# Patient Record
Sex: FEMALE | Race: WHITE | Employment: UNEMPLOYED | URBAN - NONMETROPOLITAN AREA
[De-identification: names, ages, dates, MRNs, and addresses within clinical notes are randomized per-mention and may not be internally consistent; named-entity substitution may affect disease eponyms.]

---
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
## 2020-10-02 ENCOUNTER — APPOINTMENT (OUTPATIENT)
Dept: CT IMAGING | Age: 20
DRG: 234 | End: 2020-10-02
Payer: MEDICAID

## 2020-10-02 ENCOUNTER — HOSPITAL ENCOUNTER (INPATIENT)
Age: 20
LOS: 1 days | Discharge: HOME | DRG: 234 | End: 2020-10-03
Attending: EMERGENCY MEDICINE | Admitting: SURGERY
Payer: MEDICAID

## 2020-10-02 ENCOUNTER — ANESTHESIA EVENT (OUTPATIENT)
Dept: OPERATING ROOM | Age: 20
DRG: 234 | End: 2020-10-02
Payer: MEDICAID

## 2020-10-02 ENCOUNTER — ANESTHESIA (OUTPATIENT)
Dept: OPERATING ROOM | Age: 20
DRG: 234 | End: 2020-10-02
Payer: MEDICAID

## 2020-10-02 VITALS — SYSTOLIC BLOOD PRESSURE: 101 MMHG | OXYGEN SATURATION: 99 % | DIASTOLIC BLOOD PRESSURE: 54 MMHG

## 2020-10-02 PROBLEM — K35.80 ACUTE APPENDICITIS: Status: ACTIVE | Noted: 2020-10-02

## 2020-10-02 LAB
ALBUMIN SERPL-MCNC: 4.6 G/DL (ref 3.5–5.2)
ALP BLD-CCNC: 65 U/L (ref 35–104)
ALT SERPL-CCNC: 7 U/L (ref 0–32)
ANION GAP SERPL CALCULATED.3IONS-SCNC: 13 MMOL/L (ref 7–16)
AST SERPL-CCNC: 14 U/L (ref 0–31)
BACTERIA: ABNORMAL /HPF
BASOPHILS ABSOLUTE: 0.02 E9/L (ref 0–0.2)
BASOPHILS RELATIVE PERCENT: 0.1 % (ref 0–2)
BILIRUB SERPL-MCNC: 0.7 MG/DL (ref 0–1.2)
BILIRUBIN URINE: NEGATIVE
BLOOD, URINE: NEGATIVE
BUN BLDV-MCNC: 9 MG/DL (ref 6–20)
CALCIUM SERPL-MCNC: 9.6 MG/DL (ref 8.6–10.2)
CHLORIDE BLD-SCNC: 99 MMOL/L (ref 98–107)
CLARITY: CLEAR
CO2: 23 MMOL/L (ref 22–29)
COLOR: YELLOW
CREAT SERPL-MCNC: 0.9 MG/DL (ref 0.5–1)
EOSINOPHILS ABSOLUTE: 0 E9/L (ref 0.05–0.5)
EOSINOPHILS RELATIVE PERCENT: 0 % (ref 0–6)
EPITHELIAL CELLS, UA: ABNORMAL /HPF
GFR AFRICAN AMERICAN: >60
GFR NON-AFRICAN AMERICAN: >60 ML/MIN/1.73
GLUCOSE BLD-MCNC: 121 MG/DL (ref 74–99)
GLUCOSE URINE: NEGATIVE MG/DL
HCG, URINE, POC: NEGATIVE
HCT VFR BLD CALC: 40.9 % (ref 34–48)
HEMOGLOBIN: 13.2 G/DL (ref 11.5–15.5)
IMMATURE GRANULOCYTES #: 0.07 E9/L
IMMATURE GRANULOCYTES %: 0.4 % (ref 0–5)
KETONES, URINE: NEGATIVE MG/DL
LACTIC ACID: 1.6 MMOL/L (ref 0.5–2.2)
LEUKOCYTE ESTERASE, URINE: ABNORMAL
LIPASE: 13 U/L (ref 13–60)
LYMPHOCYTES ABSOLUTE: 0.91 E9/L (ref 1.5–4)
LYMPHOCYTES RELATIVE PERCENT: 5.7 % (ref 20–42)
Lab: NORMAL
MCH RBC QN AUTO: 28 PG (ref 26–35)
MCHC RBC AUTO-ENTMCNC: 32.3 % (ref 32–34.5)
MCV RBC AUTO: 86.7 FL (ref 80–99.9)
MONOCYTES ABSOLUTE: 1.5 E9/L (ref 0.1–0.95)
MONOCYTES RELATIVE PERCENT: 9.4 % (ref 2–12)
NEGATIVE QC PASS/FAIL: NORMAL
NEUTROPHILS ABSOLUTE: 13.39 E9/L (ref 1.8–7.3)
NEUTROPHILS RELATIVE PERCENT: 84.4 % (ref 43–80)
NITRITE, URINE: NEGATIVE
PDW BLD-RTO: 14 FL (ref 11.5–15)
PH UA: 6 (ref 5–9)
PLATELET # BLD: 346 E9/L (ref 130–450)
PMV BLD AUTO: 9.9 FL (ref 7–12)
POSITIVE QC PASS/FAIL: NORMAL
POTASSIUM SERPL-SCNC: 3.7 MMOL/L (ref 3.5–5)
PROTEIN UA: NEGATIVE MG/DL
RBC # BLD: 4.72 E12/L (ref 3.5–5.5)
RBC UA: ABNORMAL /HPF (ref 0–2)
SODIUM BLD-SCNC: 135 MMOL/L (ref 132–146)
SPECIFIC GRAVITY UA: >=1.03 (ref 1–1.03)
TOTAL PROTEIN: 7.6 G/DL (ref 6.4–8.3)
UROBILINOGEN, URINE: 1 E.U./DL
WBC # BLD: 15.9 E9/L (ref 4.5–11.5)
WBC UA: ABNORMAL /HPF (ref 0–5)

## 2020-10-02 PROCEDURE — 0DTJ4ZZ RESECTION OF APPENDIX, PERCUTANEOUS ENDOSCOPIC APPROACH: ICD-10-PCS | Performed by: SURGERY

## 2020-10-02 PROCEDURE — 80053 COMPREHEN METABOLIC PANEL: CPT

## 2020-10-02 PROCEDURE — 99284 EMERGENCY DEPT VISIT MOD MDM: CPT

## 2020-10-02 PROCEDURE — 1200000000 HC SEMI PRIVATE

## 2020-10-02 PROCEDURE — 2720000010 HC SURG SUPPLY STERILE: Performed by: SURGERY

## 2020-10-02 PROCEDURE — 99283 EMERGENCY DEPT VISIT LOW MDM: CPT

## 2020-10-02 PROCEDURE — 81001 URINALYSIS AUTO W/SCOPE: CPT

## 2020-10-02 PROCEDURE — 3600000003 HC SURGERY LEVEL 3 BASE: Performed by: SURGERY

## 2020-10-02 PROCEDURE — G0378 HOSPITAL OBSERVATION PER HR: HCPCS

## 2020-10-02 PROCEDURE — 2580000003 HC RX 258: Performed by: NURSE PRACTITIONER

## 2020-10-02 PROCEDURE — 7100000000 HC PACU RECOVERY - FIRST 15 MIN: Performed by: SURGERY

## 2020-10-02 PROCEDURE — 99223 1ST HOSP IP/OBS HIGH 75: CPT | Performed by: SURGERY

## 2020-10-02 PROCEDURE — 99285 EMERGENCY DEPT VISIT HI MDM: CPT

## 2020-10-02 PROCEDURE — 3600000013 HC SURGERY LEVEL 3 ADDTL 15MIN: Performed by: SURGERY

## 2020-10-02 PROCEDURE — 2500000003 HC RX 250 WO HCPCS: Performed by: NURSE PRACTITIONER

## 2020-10-02 PROCEDURE — 88304 TISSUE EXAM BY PATHOLOGIST: CPT

## 2020-10-02 PROCEDURE — 6360000002 HC RX W HCPCS

## 2020-10-02 PROCEDURE — 44970 LAPAROSCOPY APPENDECTOMY: CPT | Performed by: SURGERY

## 2020-10-02 PROCEDURE — 3700000000 HC ANESTHESIA ATTENDED CARE: Performed by: SURGERY

## 2020-10-02 PROCEDURE — 96375 TX/PRO/DX INJ NEW DRUG ADDON: CPT

## 2020-10-02 PROCEDURE — 6360000004 HC RX CONTRAST MEDICATION: Performed by: RADIOLOGY

## 2020-10-02 PROCEDURE — 7100000001 HC PACU RECOVERY - ADDTL 15 MIN: Performed by: SURGERY

## 2020-10-02 PROCEDURE — 96374 THER/PROPH/DIAG INJ IV PUSH: CPT

## 2020-10-02 PROCEDURE — 2500000003 HC RX 250 WO HCPCS

## 2020-10-02 PROCEDURE — 3700000001 HC ADD 15 MINUTES (ANESTHESIA): Performed by: SURGERY

## 2020-10-02 PROCEDURE — 85025 COMPLETE CBC W/AUTO DIFF WBC: CPT

## 2020-10-02 PROCEDURE — 2580000003 HC RX 258

## 2020-10-02 PROCEDURE — 2500000003 HC RX 250 WO HCPCS: Performed by: SURGERY

## 2020-10-02 PROCEDURE — 74177 CT ABD & PELVIS W/CONTRAST: CPT

## 2020-10-02 PROCEDURE — 83690 ASSAY OF LIPASE: CPT

## 2020-10-02 PROCEDURE — 96361 HYDRATE IV INFUSION ADD-ON: CPT

## 2020-10-02 PROCEDURE — 2709999900 HC NON-CHARGEABLE SUPPLY: Performed by: SURGERY

## 2020-10-02 PROCEDURE — 6360000002 HC RX W HCPCS: Performed by: NURSE PRACTITIONER

## 2020-10-02 PROCEDURE — 83605 ASSAY OF LACTIC ACID: CPT

## 2020-10-02 RX ORDER — MIDAZOLAM HYDROCHLORIDE 1 MG/ML
INJECTION INTRAMUSCULAR; INTRAVENOUS PRN
Status: DISCONTINUED | OUTPATIENT
Start: 2020-10-02 | End: 2020-10-02 | Stop reason: SDUPTHER

## 2020-10-02 RX ORDER — BUPIVACAINE HYDROCHLORIDE AND EPINEPHRINE 2.5; 5 MG/ML; UG/ML
INJECTION, SOLUTION EPIDURAL; INFILTRATION; INTRACAUDAL; PERINEURAL PRN
Status: DISCONTINUED | OUTPATIENT
Start: 2020-10-02 | End: 2020-10-02 | Stop reason: ALTCHOICE

## 2020-10-02 RX ORDER — 0.9 % SODIUM CHLORIDE 0.9 %
1000 INTRAVENOUS SOLUTION INTRAVENOUS ONCE
Status: COMPLETED | OUTPATIENT
Start: 2020-10-02 | End: 2020-10-02

## 2020-10-02 RX ORDER — FENTANYL CITRATE 50 UG/ML
INJECTION, SOLUTION INTRAMUSCULAR; INTRAVENOUS PRN
Status: DISCONTINUED | OUTPATIENT
Start: 2020-10-02 | End: 2020-10-02 | Stop reason: SDUPTHER

## 2020-10-02 RX ORDER — ROCURONIUM BROMIDE 10 MG/ML
INJECTION, SOLUTION INTRAVENOUS PRN
Status: DISCONTINUED | OUTPATIENT
Start: 2020-10-02 | End: 2020-10-02 | Stop reason: SDUPTHER

## 2020-10-02 RX ORDER — PROMETHAZINE HYDROCHLORIDE 25 MG/ML
6.25 INJECTION, SOLUTION INTRAMUSCULAR; INTRAVENOUS
Status: DISCONTINUED | OUTPATIENT
Start: 2020-10-02 | End: 2020-10-02 | Stop reason: HOSPADM

## 2020-10-02 RX ORDER — LABETALOL HYDROCHLORIDE 5 MG/ML
5 INJECTION, SOLUTION INTRAVENOUS EVERY 10 MIN PRN
Status: DISCONTINUED | OUTPATIENT
Start: 2020-10-02 | End: 2020-10-02 | Stop reason: HOSPADM

## 2020-10-02 RX ORDER — LIDOCAINE HYDROCHLORIDE 20 MG/ML
INJECTION, SOLUTION INTRAVENOUS PRN
Status: DISCONTINUED | OUTPATIENT
Start: 2020-10-02 | End: 2020-10-02 | Stop reason: SDUPTHER

## 2020-10-02 RX ORDER — HYDROCODONE BITARTRATE AND ACETAMINOPHEN 5; 325 MG/1; MG/1
1 TABLET ORAL EVERY 4 HOURS PRN
Qty: 18 TABLET | Refills: 0 | Status: SHIPPED | OUTPATIENT
Start: 2020-10-02 | End: 2020-10-07

## 2020-10-02 RX ORDER — ONDANSETRON 2 MG/ML
INJECTION INTRAMUSCULAR; INTRAVENOUS PRN
Status: DISCONTINUED | OUTPATIENT
Start: 2020-10-02 | End: 2020-10-02 | Stop reason: SDUPTHER

## 2020-10-02 RX ORDER — MORPHINE SULFATE 2 MG/ML
2 INJECTION, SOLUTION INTRAMUSCULAR; INTRAVENOUS ONCE
Status: COMPLETED | OUTPATIENT
Start: 2020-10-02 | End: 2020-10-02

## 2020-10-02 RX ORDER — SODIUM CHLORIDE 9 MG/ML
INJECTION, SOLUTION INTRAVENOUS CONTINUOUS PRN
Status: DISCONTINUED | OUTPATIENT
Start: 2020-10-02 | End: 2020-10-02 | Stop reason: SDUPTHER

## 2020-10-02 RX ORDER — ONDANSETRON 2 MG/ML
4 INJECTION INTRAMUSCULAR; INTRAVENOUS ONCE
Status: COMPLETED | OUTPATIENT
Start: 2020-10-02 | End: 2020-10-02

## 2020-10-02 RX ORDER — HYDRALAZINE HYDROCHLORIDE 20 MG/ML
5 INJECTION INTRAMUSCULAR; INTRAVENOUS EVERY 10 MIN PRN
Status: DISCONTINUED | OUTPATIENT
Start: 2020-10-02 | End: 2020-10-02 | Stop reason: HOSPADM

## 2020-10-02 RX ORDER — MEPERIDINE HYDROCHLORIDE 25 MG/ML
12.5 INJECTION INTRAMUSCULAR; INTRAVENOUS; SUBCUTANEOUS EVERY 5 MIN PRN
Status: DISCONTINUED | OUTPATIENT
Start: 2020-10-02 | End: 2020-10-02 | Stop reason: HOSPADM

## 2020-10-02 RX ORDER — SUCCINYLCHOLINE/SOD CL,ISO/PF 200MG/10ML
SYRINGE (ML) INTRAVENOUS PRN
Status: DISCONTINUED | OUTPATIENT
Start: 2020-10-02 | End: 2020-10-02 | Stop reason: SDUPTHER

## 2020-10-02 RX ORDER — MORPHINE SULFATE 4 MG/ML
4 INJECTION, SOLUTION INTRAMUSCULAR; INTRAVENOUS ONCE
Status: DISCONTINUED | OUTPATIENT
Start: 2020-10-02 | End: 2020-10-02

## 2020-10-02 RX ORDER — PROPOFOL 10 MG/ML
INJECTION, EMULSION INTRAVENOUS PRN
Status: DISCONTINUED | OUTPATIENT
Start: 2020-10-02 | End: 2020-10-02 | Stop reason: SDUPTHER

## 2020-10-02 RX ORDER — DEXAMETHASONE SODIUM PHOSPHATE 10 MG/ML
INJECTION, SOLUTION INTRAMUSCULAR; INTRAVENOUS PRN
Status: DISCONTINUED | OUTPATIENT
Start: 2020-10-02 | End: 2020-10-02 | Stop reason: SDUPTHER

## 2020-10-02 RX ADMIN — SODIUM CHLORIDE 1000 ML: 9 INJECTION, SOLUTION INTRAVENOUS at 19:39

## 2020-10-02 RX ADMIN — ONDANSETRON HYDROCHLORIDE 4 MG: 2 INJECTION, SOLUTION INTRAMUSCULAR; INTRAVENOUS at 22:25

## 2020-10-02 RX ADMIN — ROCURONIUM BROMIDE 20 MG: 10 INJECTION, SOLUTION INTRAVENOUS at 22:22

## 2020-10-02 RX ADMIN — DEXAMETHASONE SODIUM PHOSPHATE 10 MG: 10 INJECTION, SOLUTION INTRAMUSCULAR; INTRAVENOUS at 22:12

## 2020-10-02 RX ADMIN — METRONIDAZOLE 500 MG: 500 INJECTION, SOLUTION INTRAVENOUS at 21:46

## 2020-10-02 RX ADMIN — FENTANYL CITRATE 50 MCG: 50 INJECTION, SOLUTION INTRAMUSCULAR; INTRAVENOUS at 22:21

## 2020-10-02 RX ADMIN — IOPAMIDOL 90 ML: 755 INJECTION, SOLUTION INTRAVENOUS at 20:10

## 2020-10-02 RX ADMIN — CEFTRIAXONE SODIUM 2 G: 2 INJECTION, POWDER, FOR SOLUTION INTRAMUSCULAR; INTRAVENOUS at 21:45

## 2020-10-02 RX ADMIN — MIDAZOLAM 2 MG: 1 INJECTION INTRAMUSCULAR; INTRAVENOUS at 22:05

## 2020-10-02 RX ADMIN — Medication 140 MG: at 22:07

## 2020-10-02 RX ADMIN — PROPOFOL 150 MG: 10 INJECTION, EMULSION INTRAVENOUS at 22:07

## 2020-10-02 RX ADMIN — LIDOCAINE HYDROCHLORIDE 100 MG: 20 INJECTION, SOLUTION INTRAVENOUS at 22:07

## 2020-10-02 RX ADMIN — FENTANYL CITRATE 100 MCG: 50 INJECTION, SOLUTION INTRAMUSCULAR; INTRAVENOUS at 22:07

## 2020-10-02 RX ADMIN — MORPHINE SULFATE 2 MG: 2 INJECTION, SOLUTION INTRAMUSCULAR; INTRAVENOUS at 19:38

## 2020-10-02 RX ADMIN — SODIUM CHLORIDE: 9 INJECTION, SOLUTION INTRAVENOUS at 21:58

## 2020-10-02 RX ADMIN — ONDANSETRON 4 MG: 2 INJECTION INTRAMUSCULAR; INTRAVENOUS at 19:39

## 2020-10-02 RX ADMIN — SUGAMMADEX 250 MG: 100 INJECTION, SOLUTION INTRAVENOUS at 22:43

## 2020-10-02 ASSESSMENT — PULMONARY FUNCTION TESTS
PIF_VALUE: 16
PIF_VALUE: 19
PIF_VALUE: 1
PIF_VALUE: 17
PIF_VALUE: 13
PIF_VALUE: 18
PIF_VALUE: 23
PIF_VALUE: 22
PIF_VALUE: 1
PIF_VALUE: 29
PIF_VALUE: 18
PIF_VALUE: 21
PIF_VALUE: 22
PIF_VALUE: 23
PIF_VALUE: 22
PIF_VALUE: 14
PIF_VALUE: 0
PIF_VALUE: 22
PIF_VALUE: 23
PIF_VALUE: 13
PIF_VALUE: 16
PIF_VALUE: 22
PIF_VALUE: 1
PIF_VALUE: 26
PIF_VALUE: 17
PIF_VALUE: 23
PIF_VALUE: 23
PIF_VALUE: 1
PIF_VALUE: 22
PIF_VALUE: 10
PIF_VALUE: 19
PIF_VALUE: 19
PIF_VALUE: 1
PIF_VALUE: 1
PIF_VALUE: 13
PIF_VALUE: 23
PIF_VALUE: 19
PIF_VALUE: 17
PIF_VALUE: 22
PIF_VALUE: 24
PIF_VALUE: 1
PIF_VALUE: 2
PIF_VALUE: 17
PIF_VALUE: 19
PIF_VALUE: 19
PIF_VALUE: 20
PIF_VALUE: 1
PIF_VALUE: 1
PIF_VALUE: 16
PIF_VALUE: 1
PIF_VALUE: 16
PIF_VALUE: 13

## 2020-10-02 ASSESSMENT — PAIN SCALES - GENERAL
PAINLEVEL_OUTOF10: 0
PAINLEVEL_OUTOF10: 0
PAINLEVEL_OUTOF10: 7
PAINLEVEL_OUTOF10: 0
PAINLEVEL_OUTOF10: 7

## 2020-10-02 ASSESSMENT — PAIN DESCRIPTION - LOCATION: LOCATION: ABDOMEN

## 2020-10-02 NOTE — ED NOTES
Bed: 07  Expected date:   Expected time:   Means of arrival:   Comments:  triage     Debbie Posadas RN  10/02/20 8719

## 2020-10-03 VITALS
SYSTOLIC BLOOD PRESSURE: 103 MMHG | RESPIRATION RATE: 16 BRPM | TEMPERATURE: 97.5 F | HEIGHT: 65 IN | WEIGHT: 140 LBS | BODY MASS INDEX: 23.32 KG/M2 | HEART RATE: 68 BPM | DIASTOLIC BLOOD PRESSURE: 55 MMHG | OXYGEN SATURATION: 98 %

## 2020-10-03 LAB
BASOPHILS ABSOLUTE: 0.02 E9/L (ref 0–0.2)
BASOPHILS RELATIVE PERCENT: 0.2 % (ref 0–2)
EOSINOPHILS ABSOLUTE: 0 E9/L (ref 0.05–0.5)
EOSINOPHILS RELATIVE PERCENT: 0 % (ref 0–6)
HCT VFR BLD CALC: 36 % (ref 34–48)
HEMOGLOBIN: 11.5 G/DL (ref 11.5–15.5)
IMMATURE GRANULOCYTES #: 0.04 E9/L
IMMATURE GRANULOCYTES %: 0.3 % (ref 0–5)
LYMPHOCYTES ABSOLUTE: 0.63 E9/L (ref 1.5–4)
LYMPHOCYTES RELATIVE PERCENT: 5.1 % (ref 20–42)
MCH RBC QN AUTO: 27.9 PG (ref 26–35)
MCHC RBC AUTO-ENTMCNC: 31.9 % (ref 32–34.5)
MCV RBC AUTO: 87.4 FL (ref 80–99.9)
MONOCYTES ABSOLUTE: 0.3 E9/L (ref 0.1–0.95)
MONOCYTES RELATIVE PERCENT: 2.4 % (ref 2–12)
NEUTROPHILS ABSOLUTE: 11.47 E9/L (ref 1.8–7.3)
NEUTROPHILS RELATIVE PERCENT: 92 % (ref 43–80)
PDW BLD-RTO: 14.1 FL (ref 11.5–15)
PLATELET # BLD: 267 E9/L (ref 130–450)
PMV BLD AUTO: 11.8 FL (ref 7–12)
RBC # BLD: 4.12 E12/L (ref 3.5–5.5)
WBC # BLD: 12.5 E9/L (ref 4.5–11.5)

## 2020-10-03 PROCEDURE — 96372 THER/PROPH/DIAG INJ SC/IM: CPT

## 2020-10-03 PROCEDURE — 2580000003 HC RX 258: Performed by: STUDENT IN AN ORGANIZED HEALTH CARE EDUCATION/TRAINING PROGRAM

## 2020-10-03 PROCEDURE — 36415 COLL VENOUS BLD VENIPUNCTURE: CPT

## 2020-10-03 PROCEDURE — 6370000000 HC RX 637 (ALT 250 FOR IP): Performed by: STUDENT IN AN ORGANIZED HEALTH CARE EDUCATION/TRAINING PROGRAM

## 2020-10-03 PROCEDURE — 6360000002 HC RX W HCPCS: Performed by: STUDENT IN AN ORGANIZED HEALTH CARE EDUCATION/TRAINING PROGRAM

## 2020-10-03 PROCEDURE — G0378 HOSPITAL OBSERVATION PER HR: HCPCS

## 2020-10-03 PROCEDURE — 85025 COMPLETE CBC W/AUTO DIFF WBC: CPT

## 2020-10-03 RX ORDER — ACETAMINOPHEN 325 MG/1
650 TABLET ORAL EVERY 6 HOURS
Status: DISCONTINUED | OUTPATIENT
Start: 2020-10-03 | End: 2020-10-03 | Stop reason: HOSPADM

## 2020-10-03 RX ORDER — OXYCODONE HYDROCHLORIDE 5 MG/1
5 TABLET ORAL EVERY 4 HOURS PRN
Status: DISCONTINUED | OUTPATIENT
Start: 2020-10-03 | End: 2020-10-03 | Stop reason: HOSPADM

## 2020-10-03 RX ORDER — OXYCODONE HYDROCHLORIDE 10 MG/1
10 TABLET ORAL EVERY 4 HOURS PRN
Status: DISCONTINUED | OUTPATIENT
Start: 2020-10-03 | End: 2020-10-03 | Stop reason: HOSPADM

## 2020-10-03 RX ORDER — SODIUM CHLORIDE 9 MG/ML
INJECTION, SOLUTION INTRAVENOUS CONTINUOUS
Status: DISCONTINUED | OUTPATIENT
Start: 2020-10-03 | End: 2020-10-03 | Stop reason: HOSPADM

## 2020-10-03 RX ORDER — SODIUM CHLORIDE 0.9 % (FLUSH) 0.9 %
10 SYRINGE (ML) INJECTION PRN
Status: DISCONTINUED | OUTPATIENT
Start: 2020-10-03 | End: 2020-10-03 | Stop reason: HOSPADM

## 2020-10-03 RX ORDER — SODIUM CHLORIDE 0.9 % (FLUSH) 0.9 %
10 SYRINGE (ML) INJECTION EVERY 12 HOURS SCHEDULED
Status: DISCONTINUED | OUTPATIENT
Start: 2020-10-03 | End: 2020-10-03 | Stop reason: HOSPADM

## 2020-10-03 RX ORDER — ONDANSETRON 2 MG/ML
4 INJECTION INTRAMUSCULAR; INTRAVENOUS EVERY 6 HOURS PRN
Status: DISCONTINUED | OUTPATIENT
Start: 2020-10-03 | End: 2020-10-03 | Stop reason: HOSPADM

## 2020-10-03 RX ORDER — ONDANSETRON 4 MG/1
4 TABLET, ORALLY DISINTEGRATING ORAL EVERY 8 HOURS PRN
Status: DISCONTINUED | OUTPATIENT
Start: 2020-10-03 | End: 2020-10-03 | Stop reason: HOSPADM

## 2020-10-03 RX ADMIN — ENOXAPARIN SODIUM 40 MG: 40 INJECTION SUBCUTANEOUS at 08:35

## 2020-10-03 RX ADMIN — ACETAMINOPHEN 650 MG: 325 TABLET, FILM COATED ORAL at 01:04

## 2020-10-03 RX ADMIN — ACETAMINOPHEN 650 MG: 325 TABLET, FILM COATED ORAL at 08:38

## 2020-10-03 RX ADMIN — SODIUM CHLORIDE: 9 INJECTION, SOLUTION INTRAVENOUS at 01:03

## 2020-10-03 ASSESSMENT — PAIN DESCRIPTION - ORIENTATION: ORIENTATION: RIGHT;LEFT;MID

## 2020-10-03 ASSESSMENT — PAIN DESCRIPTION - LOCATION: LOCATION: ABDOMEN

## 2020-10-03 ASSESSMENT — PAIN SCALES - GENERAL
PAINLEVEL_OUTOF10: 6
PAINLEVEL_OUTOF10: 4
PAINLEVEL_OUTOF10: 4

## 2020-10-03 ASSESSMENT — PAIN DESCRIPTION - PROGRESSION: CLINICAL_PROGRESSION: NOT CHANGED

## 2020-10-03 ASSESSMENT — PAIN DESCRIPTION - DESCRIPTORS: DESCRIPTORS: ACHING;CONSTANT;DISCOMFORT

## 2020-10-03 ASSESSMENT — PAIN DESCRIPTION - ONSET: ONSET: GRADUAL

## 2020-10-03 ASSESSMENT — PAIN DESCRIPTION - FREQUENCY: FREQUENCY: INTERMITTENT

## 2020-10-03 ASSESSMENT — PAIN - FUNCTIONAL ASSESSMENT: PAIN_FUNCTIONAL_ASSESSMENT: PREVENTS OR INTERFERES SOME ACTIVE ACTIVITIES AND ADLS

## 2020-10-03 ASSESSMENT — PAIN DESCRIPTION - PAIN TYPE: TYPE: SURGICAL PAIN

## 2020-10-03 NOTE — H&P
GENERAL SURGERY  HISTORY AND PHYSICAL  10/2/2020    Chief Complaint   Patient presents with    Abdominal Pain     started 0500 this AM, N/V/D, mid lower pain       [facility]  Pravin Carr is a 21 y.o. female who presents for evaluation of lower abdominal pain since approximately 5 AM.  Denies previous medical problems or previous surgery. States that pain occurred this morning and has been constant and severe throughout the day without improvement. Nothing seems to make the pain better or worse. She had a single episode of watery diarrhea today. Denies fevers or chills. Reports nonbilious emesis a few times today. Has not eaten all day. No sick contacts. Other than episode of diarrhea today denies changes in bowel habits. No family history of Crohn's disease or ulcerative colitis  Denies tobacco, alcohol, or illicit drug use. WBCs 15.9, tachycardic to 107. CT A/P reviewed consistent with acute appendicitis      History reviewed. No pertinent past medical history. History reviewed. No pertinent surgical history. Prior to Admission medications    Medication Sig Start Date End Date Taking? Authorizing Provider   ibuprofen (ADVIL;MOTRIN) 800 MG tablet Take 1 tablet by mouth every 8 hours as needed for Pain 6/14/18   Mini Negrete MD       No Known Allergies    History reviewed. No pertinent family history. Social History     Tobacco Use    Smoking status: Never Smoker    Smokeless tobacco: Never Used   Substance Use Topics    Alcohol use: No    Drug use: No         Review of Systems: pertinent ROS listed in HPI, all others negative       PHYSICAL EXAM:    Vitals:    10/02/20 1720   BP: 124/86   Pulse:    Resp: 18   Temp:    SpO2:        GENERAL:  NAD. A&Ox3. HEAD:  Normocephalic, atraumatic. EYES:   No scleral icterus. PERRLA. LUNGS:  No increased work of breathing. CARDIOVASCULAR: RR  ABDOMEN:  Soft, non-distended, RLQ TTP with guarding and mild rebound. No rigidity.  No scars or

## 2020-10-03 NOTE — ED PROVIDER NOTES
ED Attending  CC: No       Department of Emergency Medicine   ED  Provider Note  Admit Date/RoomTime: 10/2/2020  6:33 PM  ED Room: OR POOL /NONE  Chief Complaint:       Abdominal Pain (started 0500 this AM, N/V/D, mid lower pain)    History of Present Illness   Source of history provided by:  patient. History/Exam Limitations: none. Pravin Carr is a 21 y.o. old female who has a past medical history of: History reviewed. No pertinent past medical history. presents to the emergency department by private vehicle, for complaints of sudden onset aching, cramping pain in the lower abdomen without radiation which began several hour(s) prior to arrival.   There has been no similar episodes in the past .  Since onset the symptoms have been persistent. The pain is associated with nausea,vomiting, and diarrhea. The pain is aggravated by movement and relieved by nothing. There has been NO back pain, chills, cloudy urine or constipation. .  ROS   Pertinent positives and negatives are stated within HPI, all other systems reviewed and are negative. History reviewed. No pertinent surgical history. Social History:  reports that she has never smoked. She has never used smokeless tobacco. She reports that she does not drink alcohol or use drugs. Family History: family history is not on file. Allergies: Patient has no known allergies. Physical Exam            ED Triage Vitals   BP Temp Temp Source Pulse Resp SpO2 Height Weight   10/02/20 1720 10/02/20 1701 10/02/20 1701 10/02/20 1701 10/02/20 1720 10/02/20 1701 10/02/20 1720 10/02/20 1720   124/86 96.2 °F (35.7 °C) Temporal 107 18 97 % 5' 5\" (1.651 m) 140 lb (63.5 kg)      Oxygen Saturation Interpretation: Normal.    · General Appearance/Constitutional:  Alert, development consistent with age  · HEENT:  NC/NT. PERRLA. Airway patent. · Neck:  Supple. No lymphadenopathy. · Respiratory:  No retractions.   Lungs Clear to auscultation and breath sounds equal.  · CV:  Regular rate and rhythm. · GI:  General Appearance: normal.         Bowel sounds: normal bowel sounds. Distension:  None. Tenderness: moderate tenderness is present in the lower abdomen, rebound tenderness is present in the lower abdomen, no guarding. Liver: non-palpable and non-tender. Spleen:  non-palpable and non-tender. Pulsatile Mass: absent. Hernia:  no inguinal or femoral hernias noted. · Back: CVA Tenderness: No.  · Integument:  Normal turgor. Warm, dry, without visible rash, unless noted elsewhere. · Lymphatics: No edema, cap.refill <3sec. · Neurological:  Orientation age-appropriate. Motor functions intact.     Lab / Imaging Results   (All laboratory and radiology results have been personally reviewed by myself)  Labs:  Results for orders placed or performed during the hospital encounter of 10/02/20   Comprehensive Metabolic Panel   Result Value Ref Range    Sodium 135 132 - 146 mmol/L    Potassium 3.7 3.5 - 5.0 mmol/L    Chloride 99 98 - 107 mmol/L    CO2 23 22 - 29 mmol/L    Anion Gap 13 7 - 16 mmol/L    Glucose 121 (H) 74 - 99 mg/dL    BUN 9 6 - 20 mg/dL    CREATININE 0.9 0.5 - 1.0 mg/dL    GFR Non-African American >60 >=60 mL/min/1.73    GFR African American >60     Calcium 9.6 8.6 - 10.2 mg/dL    Total Protein 7.6 6.4 - 8.3 g/dL    Alb 4.6 3.5 - 5.2 g/dL    Total Bilirubin 0.7 0.0 - 1.2 mg/dL    Alkaline Phosphatase 65 35 - 104 U/L    ALT 7 0 - 32 U/L    AST 14 0 - 31 U/L   CBC Auto Differential   Result Value Ref Range    WBC 15.9 (H) 4.5 - 11.5 E9/L    RBC 4.72 3.50 - 5.50 E12/L    Hemoglobin 13.2 11.5 - 15.5 g/dL    Hematocrit 40.9 34.0 - 48.0 %    MCV 86.7 80.0 - 99.9 fL    MCH 28.0 26.0 - 35.0 pg    MCHC 32.3 32.0 - 34.5 %    RDW 14.0 11.5 - 15.0 fL    Platelets 247 376 - 546 E9/L    MPV 9.9 7.0 - 12.0 fL    Neutrophils % 84.4 (H) 43.0 - 80.0 %    Immature Granulocytes % 0.4 0.0 - 5.0 %    Lymphocytes % 5.7 (L) 20.0 - 42.0 %    Monocytes % 9.4 2.0 - 12.0 %    Eosinophils % 0.0 0.0 - 6.0 %    Basophils % 0.1 0.0 - 2.0 %    Neutrophils Absolute 13.39 (H) 1.80 - 7.30 E9/L    Immature Granulocytes # 0.07 E9/L    Lymphocytes Absolute 0.91 (L) 1.50 - 4.00 E9/L    Monocytes Absolute 1.50 (H) 0.10 - 0.95 E9/L    Eosinophils Absolute 0.00 (L) 0.05 - 0.50 E9/L    Basophils Absolute 0.02 0.00 - 0.20 E9/L   Lipase   Result Value Ref Range    Lipase 13 13 - 60 U/L   Urinalysis with Microscopic   Result Value Ref Range    Color, UA Yellow Straw/Yellow    Clarity, UA Clear Clear    Glucose, Ur Negative Negative mg/dL    Bilirubin Urine Negative Negative    Ketones, Urine Negative Negative mg/dL    Specific Gravity, UA >=1.030 1.005 - 1.030    Blood, Urine Negative Negative    pH, UA 6.0 5.0 - 9.0    Protein, UA Negative Negative mg/dL    Urobilinogen, Urine 1.0 <2.0 E.U./dL    Nitrite, Urine Negative Negative    Leukocyte Esterase, Urine TRACE (A) Negative    WBC, UA 0-1 0 - 5 /HPF    RBC, UA NONE 0 - 2 /HPF    Epithelial Cells, UA MODERATE /HPF    Bacteria, UA FEW (A) None Seen /HPF   Lactic Acid, Plasma   Result Value Ref Range    Lactic Acid 1.6 0.5 - 2.2 mmol/L   POC Pregnancy Urine Qual   Result Value Ref Range    HCG, Urine, POC Negative Negative    Lot Number [id number]     Positive QC Pass/Fail Pass     Negative QC Pass/Fail Pass      Imaging: All Radiology results interpreted by Radiologist unless otherwise noted. CT ABDOMEN PELVIS W IV CONTRAST Additional Contrast? None   Final Result   Acute appendicitis with thickened inflamed appendix without complications. There is small-bowel ileus. Somewhat thickened urinary bladder. Cystitis is considered. Complex bilateral ovarian cysts with a small amount of free fluid. If   clinically indicated, consider ultrasonographic surveillance. RECOMMENDATIONS:   The ED physician was notified.              ED Course / Medical Decision Making     Medications   metronidazole (FLAGYL) 500 mg in NaCl 100 mL IVPB premix (500 mg Intravenous New Bag 10/2/20 2146)   0.9 % sodium chloride bolus (0 mLs Intravenous Stopped 10/2/20 2153)   ondansetron (ZOFRAN) injection 4 mg (4 mg Intravenous Given 10/2/20 1939)   morphine (PF) injection 2 mg (2 mg Intravenous Given 10/2/20 1938)   iopamidol (ISOVUE-370) 76 % injection 90 mL (90 mLs Intravenous Given 10/2/20 2010)   cefTRIAXone (ROCEPHIN) 2 g in sterile water 20 mL IV syringe (2 g Intravenous Given 10/2/20 2145)        Re-Evaluations:  10/2/20      Time: 2100    Patients symptoms are improving. Consultations:             IP CONSULT TO GENERAL SURGERY  Spoke with Dr. Marie De Leon (general surgery). Discussed case. They will admit this patient. Procedures:   none    MDM: Patient will be admitted to the hospital for acute appendicitis. Patient at this time is nontoxic in appearance and in distress. Patient at this time is agreeable to plan of care questions were answered. Patient was seen and evaluated by Dr. Oswaldo Vazquez as well as general surgery. Patient was treated with IV fluids pain medication antinausea medication and IV antibiotics. Counseling:   I have spoken with the patient and discussed todays results, in addition to providing specific details for the plan of care and counseling regarding the diagnosis and prognosis and are agreeable with the plan. Assessment    Acute appendicitis     This patient's ED course included: a personal history and physicial examination, re-evaluation prior to disposition and multiple bedside re-evaluations  This patient has improved during their ED course. Plan   Admit to med/surg floor. Patient condition is stable. New Medications     Current Discharge Medication List        Electronically signed by JARED Pastrana CNP   DD: 10/2/20  **This report was transcribed using voice recognition software.  Every effort was made to ensure accuracy; however, inadvertent computerized transcription errors may be present.   END OF PROVIDER NOTE        JARED Pagan - CNP  10/02/20 15-A [address] JARED Jensen - CNP  10/02/20 5188

## 2020-10-03 NOTE — PROGRESS NOTES
Discharge instructions given and explained to pt who voiced understanding, denies needs or questions, awaits mom for ride home.

## 2020-10-03 NOTE — ANESTHESIA PRE PROCEDURE
Department of Anesthesiology  Preprocedure Note       Name:  Desiree Bonds   Age:  21 y.o.  :  2000                                          MRN:  [id number]         Date:  10/2/2020      Surgeon: Saray Alexandra):  Elvin Beatty MD    Procedure: Procedure(s):  APPENDECTOMY LAPAROSCOPIC    Medications prior to admission:   Prior to Admission medications    Medication Sig Start Date End Date Taking? Authorizing Provider   ibuprofen (ADVIL;MOTRIN) 800 MG tablet Take 1 tablet by mouth every 8 hours as needed for Pain 18   Carrie Braden MD       Current medications:    Current Facility-Administered Medications   Medication Dose Route Frequency Provider Last Rate Last Dose    metronidazole (FLAGYL) 500 mg in NaCl 100 mL IVPB premix  500 mg Intravenous Once JARED Burdick -  mL/hr at 10/02/20 2146 500 mg at 10/02/20 2146     Current Outpatient Medications   Medication Sig Dispense Refill    ibuprofen (ADVIL;MOTRIN) 800 MG tablet Take 1 tablet by mouth every 8 hours as needed for Pain 60 tablet 0       Allergies:  No Known Allergies    Problem List:    Patient Active Problem List   Diagnosis Code    Acute appendicitis K35.80       Past Medical History:  History reviewed. No pertinent past medical history. Past Surgical History:  History reviewed. No pertinent surgical history. Social History:    Social History     Tobacco Use    Smoking status: Never Smoker    Smokeless tobacco: Never Used   Substance Use Topics    Alcohol use:  No                                Counseling given: Not Answered      Vital Signs (Current):   Vitals:    10/02/20 1701 10/02/20 1720   BP:  124/86   Pulse: 107    Resp:  18   Temp: 35.7 °C (96.2 °F)    TempSrc: Temporal    SpO2: 97%    Weight:  140 lb (63.5 kg)   Height:  5' 5\" (1.651 m)                                              BP Readings from Last 3 Encounters:   10/02/20 124/86   18 124/78       NPO Status: Time of last liquid consumption: 0500                        Time of last solid consumption: 0500                        Date of last liquid consumption: 10/02/20                        Date of last solid food consumption: 10/02/20    BMI:   Wt Readings from Last 3 Encounters:   10/02/20 140 lb (63.5 kg)   06/13/18 130 lb (59 kg) (61 %, Z= 0.27)*     * Growth percentiles are based on [facility] (Girls, 2-20 Years) data. Body mass index is 23.3 kg/m². CBC:   Lab Results   Component Value Date    WBC 15.9 10/02/2020    RBC 4.72 10/02/2020    HGB 13.2 10/02/2020    HCT 40.9 10/02/2020    MCV 86.7 10/02/2020    RDW 14.0 10/02/2020     10/02/2020       CMP:   Lab Results   Component Value Date     10/02/2020    K 3.7 10/02/2020    CL 99 10/02/2020    CO2 23 10/02/2020    BUN 9 10/02/2020    CREATININE 0.9 10/02/2020    GFRAA >60 10/02/2020    LABGLOM >60 10/02/2020    GLUCOSE 121 10/02/2020    PROT 7.6 10/02/2020    CALCIUM 9.6 10/02/2020    BILITOT 0.7 10/02/2020    ALKPHOS 65 10/02/2020    AST 14 10/02/2020    ALT 7 10/02/2020       POC Tests: No results for input(s): POCGLU, POCNA, POCK, POCCL, POCBUN, POCHEMO, POCHCT in the last 72 hours.     Coags: No results found for: PROTIME, INR, APTT    HCG (If Applicable):   Lab Results   Component Value Date    PREGTESTUR neg 05/05/2015        ABGs: No results found for: PHART, PO2ART, JBL4LWS, YLW8GRC, BEART, G7WFSFKQ     Type & Screen (If Applicable):  No results found for: LABABO, LABRH    Drug/Infectious Status (If Applicable):  No results found for: HIV, HEPCAB    COVID-19 Screening (If Applicable): No results found for: COVID19      Anesthesia Evaluation  Patient summary reviewed and Nursing notes reviewed no history of anesthetic complications:   Airway: Mallampati: II  TM distance: >3 FB   Neck ROM: full  Mouth opening: > = 3 FB Dental: normal exam         Pulmonary:Negative Pulmonary ROS breath sounds clear to auscultation                             Cardiovascular:Negative CV ROS            Rhythm: regular  Rate: normal           Beta Blocker:  Not on Beta Blocker         Neuro/Psych:   Negative Neuro/Psych ROS              GI/Hepatic/Renal:            ROS comment: appendicitis. Endo/Other: Negative Endo/Other ROS                    Abdominal:           Vascular: negative vascular ROS. CT Abdomen Pelvis W IV Contrast 10/2/20 Impression   Acute appendicitis with thickened inflamed appendix without complications. There is small-bowel ileus. Somewhat thickened urinary bladder. Cystitis is considered. Complex bilateral ovarian cysts with a small amount of free fluid. If clinically indicated, consider ultrasonographic surveillance. RECOMMENDATIONS: The ED physician was notified. Anesthesia Plan      general     ASA 2 - emergent       Induction: intravenous and rapid sequence. BIS  MIPS: Postoperative opioids intended and Prophylactic antiemetics administered. Anesthetic plan and risks discussed with patient. Use of blood products discussed with patient whom consented to blood products. Plan discussed with CRNA and attending.                   Lillian Peterson RN   10/2/2020

## 2020-10-03 NOTE — OP NOTE
Operative Note      Patient: Gume Villavicencio  YOB: 2000  MRN: [id number]    Date of Procedure: 10/2/2020    Pre-Op Diagnosis: APPENDICITIS    Post-Op Diagnosis: Same       Procedure(s):  APPENDECTOMY LAPAROSCOPIC    Surgeon(s):  Obie Gonzalez MD    Assistant:   Resident: Leigh Sifuentes DO    Anesthesia: General    Estimated Blood Loss (mL): Minimal    Complications: None    Specimens:   ID Type Source Tests Collected by Time Destination   A : appendix Tissue Appendix SURGICAL PATHOLOGY Obie Gonzalez MD 10/2/2020 5589        Implants:  * No implants in log *      Drains:   [REMOVED] Urethral Catheter Non-latex;Straight-tip 16 fr (Removed)       Findings: acute appendicitis     Detailed Description of Procedure: The patient was brought to the operating room and positioned supine on the OR table. Sequential compression devices were placed on the patient's lower extremities and functioning. Preoperative antibiotics were administered. Anesthesia was obtained without complication as per the anesthesia record. Immediately prior to the procedure a time-out was called and the surgical checklist was reviewed and agreed upon by all present. The patient was prepped and draped in the usual sterile fashion and the procedure went forth with strict aseptic technique under maximal barrier precautions. Using a 11 blade, a 5 mm supraumbilical incision was made. The Veress needle was inserted through the incision into the peritoneal cavity and placement of the Veress needle was confirmed with a saline drop test. A 5 mm Optiview trocar was inserted into the peritoneal cavity under direct visualization. Next a 10 mm port was placed in the left lower quadrant and a 5 mm port was placed in the midline suprapubic area. The abdomen was inspected and the appendix was identified. It was inflammed consistent with appendicitis. The appendix was grasped and elevated with an atraumatic grasper.  Using a [place],

## 2020-10-08 NOTE — ANESTHESIA POSTPROCEDURE EVALUATION
Department of Anesthesiology  Postprocedure Note    Patient: Tyrel Gamboa  MRN: [id number]  YOB: 2000  Date of evaluation: 10/8/2020  Time:  7:13 AM     Procedure Summary     Date:  10/02/20 Room / Location:  [facility] OR 10 / [facility]    Anesthesia Start:  2158 Anesthesia Stop:  2255    Procedure:  APPENDECTOMY LAPAROSCOPIC (N/A ) Diagnosis:  (APPENDICITIS)    Surgeon:  Regino Medina MD Responsible Provider:  Kaitlin Cam MD    Anesthesia Type:  general ASA Status:  2 - Emergent          Anesthesia Type: general    Dinesh Phase I: Dinesh Score: 10    Dinesh Phase II:      Last vitals: Reviewed and per EMR flowsheets.        Anesthesia Post Evaluation    Patient location during evaluation: PACU  Patient participation: complete - patient participated  Level of consciousness: awake and alert  Airway patency: patent  Nausea & Vomiting: no nausea and no vomiting  Complications: no  Cardiovascular status: blood pressure returned to baseline and hemodynamically stable  Respiratory status: acceptable and spontaneous ventilation  Hydration status: euvolemic

## 2020-10-19 ENCOUNTER — TELEPHONE (OUTPATIENT)
Dept: SURGERY | Age: 20
End: 2020-10-19

## 2020-11-02 ENCOUNTER — OFFICE VISIT (OUTPATIENT)
Dept: SURGERY | Age: 20
End: 2020-11-02
Payer: MEDICAID

## 2020-11-02 VITALS
SYSTOLIC BLOOD PRESSURE: 114 MMHG | TEMPERATURE: 96.8 F | OXYGEN SATURATION: 99 % | WEIGHT: 144 LBS | DIASTOLIC BLOOD PRESSURE: 75 MMHG | RESPIRATION RATE: 14 BRPM | BODY MASS INDEX: 23.99 KG/M2 | HEART RATE: 74 BPM | HEIGHT: 65 IN

## 2020-11-02 PROCEDURE — 99024 POSTOP FOLLOW-UP VISIT: CPT | Performed by: SURGERY

## 2020-11-02 PROCEDURE — 99212 OFFICE O/P EST SF 10 MIN: CPT | Performed by: SURGERY

## 2020-11-02 NOTE — PROGRESS NOTES
[facility]  PROGRESS NOTE  ATTENDING NOTE    Chief Complaint   Patient presents with    Post-Op Check     P/O lap appendectomy DOS 10/2/2020. Pt states doing good no questions or concerns     S:  21y/o F s/p lap appy on 10/2/2020. She is doing well. Tolerating diet. Having normal bowel function. I have reviewed her CT with her and her pathology.     /75 (Site: Right Upper Arm, Position: Sitting, Cuff Size: Medium Adult)   Pulse 74   Temp 96.8 °F (36 °C) (Temporal)   Resp 14   Ht 5' 5\" (1.651 m)   Wt 144 lb (65.3 kg)   SpO2 99%   BMI 23.96 kg/m²   Gen:  NAD  Abd:  Soft, NT, ND  Wound:  C/d/i, no evidence of hernia    ASSESSMENT/PLAN:  Acute appendicitis--s/p lap appy  --no acute issues  --RTC PRN    Lady Naomy MD, MSc, FACS  11/2/2020  11:58 AM

## 2022-02-15 ENCOUNTER — OFFICE VISIT (OUTPATIENT)
Dept: FAMILY MEDICINE CLINIC | Age: 22
End: 2022-02-15
Payer: MEDICAID

## 2022-02-15 VITALS
SYSTOLIC BLOOD PRESSURE: 115 MMHG | RESPIRATION RATE: 16 BRPM | OXYGEN SATURATION: 99 % | BODY MASS INDEX: 21.17 KG/M2 | TEMPERATURE: 97.2 F | HEIGHT: 64 IN | HEART RATE: 77 BPM | WEIGHT: 124 LBS | DIASTOLIC BLOOD PRESSURE: 83 MMHG

## 2022-02-15 DIAGNOSIS — F41.9 ANXIETY: Primary | ICD-10-CM

## 2022-02-15 PROCEDURE — G8420 CALC BMI NORM PARAMETERS: HCPCS

## 2022-02-15 PROCEDURE — 99203 OFFICE O/P NEW LOW 30 MIN: CPT

## 2022-02-15 PROCEDURE — G8484 FLU IMMUNIZE NO ADMIN: HCPCS

## 2022-02-15 PROCEDURE — 1036F TOBACCO NON-USER: CPT

## 2022-02-15 PROCEDURE — G8428 CUR MEDS NOT DOCUMENT: HCPCS

## 2022-02-15 SDOH — ECONOMIC STABILITY: FOOD INSECURITY: WITHIN THE PAST 12 MONTHS, THE FOOD YOU BOUGHT JUST DIDN'T LAST AND YOU DIDN'T HAVE MONEY TO GET MORE.: NEVER TRUE

## 2022-02-15 SDOH — ECONOMIC STABILITY: FOOD INSECURITY: WITHIN THE PAST 12 MONTHS, YOU WORRIED THAT YOUR FOOD WOULD RUN OUT BEFORE YOU GOT MONEY TO BUY MORE.: NEVER TRUE

## 2022-02-15 ASSESSMENT — PATIENT HEALTH QUESTIONNAIRE - PHQ9
SUM OF ALL RESPONSES TO PHQ QUESTIONS 1-9: 0
2. FEELING DOWN, DEPRESSED OR HOPELESS: 0
SUM OF ALL RESPONSES TO PHQ QUESTIONS 1-9: 0
SUM OF ALL RESPONSES TO PHQ QUESTIONS 1-9: 0
1. LITTLE INTEREST OR PLEASURE IN DOING THINGS: 0
SUM OF ALL RESPONSES TO PHQ QUESTIONS 1-9: 0
SUM OF ALL RESPONSES TO PHQ9 QUESTIONS 1 & 2: 0

## 2022-02-15 ASSESSMENT — LIFESTYLE VARIABLES
HOW OFTEN DO YOU HAVE A DRINK CONTAINING ALCOHOL: MONTHLY OR LESS
HOW MANY STANDARD DRINKS CONTAINING ALCOHOL DO YOU HAVE ON A TYPICAL DAY: 1 OR 2

## 2022-02-15 ASSESSMENT — SOCIAL DETERMINANTS OF HEALTH (SDOH): HOW HARD IS IT FOR YOU TO PAY FOR THE VERY BASICS LIKE FOOD, HOUSING, MEDICAL CARE, AND HEATING?: NOT HARD AT ALL

## 2022-02-15 NOTE — PROGRESS NOTES
[facility] [facility] RESIDENCY PROGRAM  DATE OF VISIT : 2022    Patient : Steph Magdaleno   Age : 24 y.o.  : 2000   MRN : [id number]   ______________________________________________________________________    Chief Complaint:   Chief Complaint   Patient presents with    [facility]       HPI:   History obtained from the patient. Steph Magdaleno is a 24 y.o. female who presents today to clinic to [facility]. No past medical history per patient and she does not take any pills or medicines. PSH of appendectomy on 10/2/2020. Patient comes today requesting medical approval for an emotional support animal. Joan Deshpande states she has had undiagnosed anxiety/depression for the past 5-10 years and has never looked for any sort of mental health provider until now. Joan Deshpande states that her cat makes her feels less anxious, more relaxed and happy and has helped her deal with this conditions in the past and has been of of great health overall for her mental health and wellbeing. Joan Deshpande first noticed her anxiety  5-10 years ago when she started going to events with large groups of people and also when she had to speak in public events to large crowds. This symptoms improve with drinking alcohol which she does only when she is out with friends or on big events. ( Alcohol :  Wine, Social drinker,2-3 cups, 0-1 time per week). Joan Deshpande has never looked for help with any sort of mental health provider, but today she is willing to be referred to a psychologist for cognitive Behavioral therapy. Would not like to start any medication before trying therapy first. Today, she denies any suicidal ideation or thoughts about hurting herself or others. No hallucinations, hearing voices or having any sort of intrusive or paranoid thoughts/ideas. Denies Drugs  Denies smoking  [facility] mom and dad with anxiety and depression , mom also with bipolar disorder.   Last blood work/labs from 4 months ago (October 2020) include Lipase, CBC, CMP, UA, all nonremarkable. Past Medical History:  Past Medical History:   Diagnosis Date    Anxiety        Past Surgical History:  Past Surgical History:   Procedure Laterality Date    LAPAROSCOPIC APPENDECTOMY N/A 10/2/2020    APPENDECTOMY LAPAROSCOPIC performed by Alberto Chavis MD at [facility] OR       Family History:  Family History   Problem Relation Age of Onset    Bipolar Disorder Mother     Depression Mother     Anxiety Disorder Mother     Depression Father        Social History:  Social History     Socioeconomic History    Marital status: Single     Spouse name: None    Number of children: None    Years of education: None    Highest education level: None   Occupational History    None   Tobacco Use    Smoking status: Never Smoker    Smokeless tobacco: Never Used    Tobacco comment: occ vaping   Substance and Sexual Activity    Alcohol use: No    Drug use: No    Sexual activity: None   Other Topics Concern    None   Social History Narrative    None     Social Determinants of Health     Financial Resource Strain: Low Risk     Difficulty of Paying Living Expenses: Not hard at all   Food Insecurity: No Food Insecurity    Worried About Running Out of Food in the Last Year: Never true    [place] of Food in the Last Year: Never true   Transportation Needs:     Lack of Transportation (Medical): Not on file    Lack of Transportation (Non-Medical):  Not on file   Physical Activity:     Days of Exercise per Week: Not on file    Minutes of Exercise per Session: Not on file   Stress:     Feeling of Stress : Not on file   Social Connections:     Frequency of Communication with Friends and Family: Not on file    Frequency of Social Gatherings with Friends and Family: Not on file    Attends Hoahaoism Services: Not on file    Active Member of Clubs or Organizations: Not on file    Attends Club or Organization Meetings: Not on file    Marital Status: Not on file   Intimate Partner Violence:     Fear of Current or Ex-Partner: Not on file    Emotionally Abused: Not on file    Physically Abused: Not on file    Sexually Abused: Not on file   Housing Stability:     Unable to Pay for Housing in the Last Year: Not on file    Number of [place] in the Last Year: Not on file    Unstable Housing in the Last Year: Not on file       Allergies:   No Known Allergies    Medications:    No current outpatient medications on file. No current facility-administered medications for this visit. Review of Systems:  Review of Systems   Constitutional: Negative for activity change, chills, diaphoresis, fatigue and fever. HENT: Negative for ear discharge, sinus pressure, sinus pain, sore throat, trouble swallowing and voice change. Eyes: Negative for photophobia, redness and visual disturbance. Respiratory: Negative for cough, shortness of breath, wheezing and stridor. Cardiovascular: Negative for chest pain, palpitations and leg swelling. Gastrointestinal: Negative for abdominal distention, abdominal pain, diarrhea, nausea and vomiting. Endocrine: Negative for polydipsia, polyphagia and polyuria. Genitourinary: Negative for enuresis, flank pain, frequency, vaginal bleeding, vaginal discharge and vaginal pain. Musculoskeletal: Negative for arthralgias, back pain, gait problem, neck pain and neck stiffness. Skin: Negative for pallor, rash and wound. Neurological: Negative for tremors, seizures, syncope, speech difficulty, weakness and numbness. Psychiatric/Behavioral: Positive for decreased concentration. Negative for hallucinations, self-injury, sleep disturbance and suicidal ideas. The patient is nervous/anxious.  The patient is not hyperactive.      ______________________________________________________________________    Physical Exam:    Vitals: /83 (Site: Right Upper Arm, Position: Sitting, Cuff Size: Medium Adult)   Pulse 77   Temp 97.2 °F (36.2 °C) (Temporal)   Resp 16   Ht 5' 4\" (1.626 m)   Wt 124 lb (56.2 kg)   LMP 02/08/2022   SpO2 99%   BMI 21.28 kg/m²   Physical Exam  Constitutional:       General: She is not in acute distress. Appearance: She is normal weight. She is not ill-appearing or toxic-appearing. HENT:      Head: Normocephalic and atraumatic. Right Ear: Tympanic membrane normal.      Left Ear: Tympanic membrane normal.      Nose: Nose normal.      Mouth/Throat:      Mouth: Mucous membranes are moist.      Pharynx: No oropharyngeal exudate. Eyes:      Pupils: Pupils are equal, round, and reactive to light. Cardiovascular:      Rate and Rhythm: Normal rate and regular rhythm. Pulses: Normal pulses. Heart sounds: Normal heart sounds. No murmur heard. No gallop. Pulmonary:      Effort: Pulmonary effort is normal. No respiratory distress. Breath sounds: No wheezing, rhonchi or rales. Abdominal:      General: Abdomen is flat. Bowel sounds are normal. There is no distension. Palpations: There is no mass. Tenderness: There is no abdominal tenderness. Musculoskeletal:         General: No tenderness or signs of injury. Cervical back: Normal range of motion. Right lower leg: No edema. Left lower leg: No edema. Skin:     General: Skin is warm. Capillary Refill: Capillary refill takes less than 2 seconds. Coloration: Skin is not jaundiced. Neurological:      General: No focal deficit present. Mental Status: She is alert and oriented to person, place, and time. Cranial Nerves: No cranial nerve deficit. Motor: No weakness. Gait: Gait normal.   Psychiatric:         Behavior: Behavior normal.         Thought Content: Thought content normal.         Judgment: Judgment normal.      Comments: Sad mood,somewhat reserved upon questioning initially but more comfortable at a later time. ______________________________________________________________________    Assessment & Plan:  1. Anxiety  Stephanie Enrique would not like to start taking any medication without trying Cognitive behavioral therapy first. She was instructed on the risks/benefits of starting SSRI and the benefits that counseling and Cognitive behavioral therapy can provided and add for a complete medical/CBT approach. She was agreeable to start seeing Dr. Kayce Cochran in this same office in 1-2 weeks if able to set up appointment. Will call me or this office if she is not able to schedule appointment with her in 1-2 weeks. IKE form was filled out during this visit. - [facility]        RTC in 1 month to address  health maintenance and anxiety follow up. Return to Office: Return in about 1 month (around 3/15/2022), or if symptoms worsen or fail to improve, for Screening labs follow up anxiety. Joel De La Rosa MD   This case was discussed with Dr. Urmila Agee

## 2022-02-15 NOTE — PROGRESS NOTES
Attending Physician Statement    S:   Chief Complaint   Patient presents with   Raad Katz Establish Care      No past medical history . Had appendectomy 2 months ago. watns to have approval for emotional support animal.   Has anxiety and depression for past 5 years. Has never seen anyone for this or taken anythign for this. Feels sad and depressed. Has anxiety 2//2 school and life in general. Not finding relief with any hobbies or usual activities. Does nto want medications. Ok to talk to counselor. She denies any drug/alcohol/tobacco.     Her cat heps her feel less anxious and happier when she is at home . O: Blood pressure 115/83, pulse 77, temperature 97.2 °F (36.2 °C), temperature source Temporal, resp. rate 16, height 5' 4\" (1.626 m), weight 124 lb (56.2 kg), last menstrual period 02/08/2022, SpO2 99 %. Exam:   Heart - RRR   Lungs - clear     A: anxiety/depression  P:  IKE paperwork    Establish with Dr. Ami Gray as ordered    Attending Attestation   I have discussed the case, including pertinent history and exam findings with the resident. I also have personally seen and examined the patient. I agree with the documented assessment and plan.

## 2022-02-16 ASSESSMENT — ENCOUNTER SYMPTOMS
DIARRHEA: 0
ABDOMINAL PAIN: 0
NAUSEA: 0
SINUS PAIN: 0
PHOTOPHOBIA: 0
VOICE CHANGE: 0
BACK PAIN: 0
SINUS PRESSURE: 0
COUGH: 0
TROUBLE SWALLOWING: 0
SORE THROAT: 0
WHEEZING: 0
SHORTNESS OF BREATH: 0
ABDOMINAL DISTENTION: 0
VOMITING: 0
EYE REDNESS: 0
STRIDOR: 0

## 2022-03-17 ENCOUNTER — OFFICE VISIT (OUTPATIENT)
Dept: FAMILY MEDICINE CLINIC | Age: 22
End: 2022-03-17
Payer: MEDICAID

## 2022-03-17 VITALS
TEMPERATURE: 98.1 F | HEIGHT: 64 IN | BODY MASS INDEX: 22.02 KG/M2 | DIASTOLIC BLOOD PRESSURE: 54 MMHG | RESPIRATION RATE: 14 BRPM | SYSTOLIC BLOOD PRESSURE: 108 MMHG | WEIGHT: 129 LBS | HEART RATE: 74 BPM | OXYGEN SATURATION: 99 %

## 2022-03-17 DIAGNOSIS — Z00.00 HEALTHCARE MAINTENANCE: Primary | ICD-10-CM

## 2022-03-17 DIAGNOSIS — F41.9 ANXIETY: ICD-10-CM

## 2022-03-17 DIAGNOSIS — Z00.00 HEALTHCARE MAINTENANCE: ICD-10-CM

## 2022-03-17 PROCEDURE — G8420 CALC BMI NORM PARAMETERS: HCPCS

## 2022-03-17 PROCEDURE — 36415 COLL VENOUS BLD VENIPUNCTURE: CPT | Performed by: FAMILY MEDICINE

## 2022-03-17 PROCEDURE — 99213 OFFICE O/P EST LOW 20 MIN: CPT

## 2022-03-17 PROCEDURE — 1036F TOBACCO NON-USER: CPT

## 2022-03-17 PROCEDURE — 99212 OFFICE O/P EST SF 10 MIN: CPT

## 2022-03-17 PROCEDURE — G8427 DOCREV CUR MEDS BY ELIG CLIN: HCPCS

## 2022-03-17 PROCEDURE — G8484 FLU IMMUNIZE NO ADMIN: HCPCS

## 2022-03-17 ASSESSMENT — LIFESTYLE VARIABLES: HOW OFTEN DO YOU HAVE A DRINK CONTAINING ALCOHOL: NEVER

## 2022-03-17 NOTE — PROGRESS NOTES
Attending Physician Statement    S:   Chief Complaint   Patient presents with    Check-Up      F/U anxiety. Got a therapy cat, which seems to help. No further panic attacks. Will be seeing psychologist for CBT. O: Blood pressure (!) 108/54, pulse 74, temperature 98.1 °F (36.7 °C), temperature source Temporal, resp. rate 14, height 5' 4\" (1.626 m), weight 129 lb (58.5 kg), last menstrual period 03/04/2022, SpO2 99 %. Exam:   Heart - RRR   Lungs - clear   Psych - normal mood and affect, not anxious appearing  A: As above  P:  Schedule pap smear   Hep C, HIV, TSH   Follow-up as ordered    I have discussed the case, including pertinent history and exam findings with the resident. I agree with the documented assessment and plan.

## 2022-03-17 NOTE — PROGRESS NOTES
[facility] RESIDENCY PROGRAM  DATE OF VISIT : 3/18/2022    Patient : Ofelia Castellanos   Age : 24 y.o.  : 2000   MRN : [id number]   ______________________________________________________________________    Chief Complaint:   Chief Complaint   Patient presents with    Check-Up       HPI:   History obtained from the patient. Ofelia Castellanos is a 24 y.o. female who presents today to clinic for follow up on anxiety and healthcare maintenance. Last visit, patient was instructed to follow up with psychologist Dr. Mariana Trinh and paperwork was approved for having her therapy pet living with her in her current apartment at [facility]. Today, Corina Gupta states that she feels better since she has her cat with her as she feels that it makes her feel more relaxed and stress free after returning from her academics. She will follow with psychologist next week as instructed and appointment is set up. Corina Gupta will like to follow up with previous plan regarding not starting any medication before seeing psychologist.  Denies having any panic attacks, suicidal thoughts or ideations, hallucinations or worsening of her anxiety since last visit. Patient has not had a pap smear and wants to follow up with this in this office. Currently sexually active, one male partner uses condoms. Has not had HPV vaccine.         Past Medical History:  Past Medical History:   Diagnosis Date    Anxiety        Past Surgical History:  Past Surgical History:   Procedure Laterality Date    LAPAROSCOPIC APPENDECTOMY N/A 10/2/2020    APPENDECTOMY LAPAROSCOPIC performed by Greg Braun MD at [facility] OR       Family History:  Family History   Problem Relation Age of Onset    Bipolar Disorder Mother     Depression Mother     Anxiety Disorder Mother     Depression Father        Social History:  Social History     Socioeconomic History    Marital status: Single     Spouse name: None    Number of children: None    Years of education: None    Highest education level: None   Occupational History    None   Tobacco Use    Smoking status: Never Smoker    Smokeless tobacco: Never Used    Tobacco comment: occ vaping   Substance and Sexual Activity    Alcohol use: No    Drug use: No    Sexual activity: None   Other Topics Concern    None   Social History Narrative    None     Social Determinants of Health     Financial Resource Strain: Low Risk     Difficulty of Paying Living Expenses: Not hard at all   Food Insecurity: No Food Insecurity    Worried About Running Out of Food in the Last Year: Never true    Agapito of Food in the Last Year: Never true   Transportation Needs:     Lack of Transportation (Medical): Not on file    Lack of Transportation (Non-Medical): Not on file   Physical Activity:     Days of Exercise per Week: Not on file    Minutes of Exercise per Session: Not on file   Stress:     Feeling of Stress : Not on file   Social Connections:     Frequency of Communication with Friends and Family: Not on file    Frequency of Social Gatherings with Friends and Family: Not on file    Attends Muslim Services: Not on file    Active Member of [address] [place] [address] or Organizations: Not on file    Attends Club or Organization Meetings: Not on file    Marital Status: Not on file   Intimate Partner Violence:     Fear of Current or Ex-Partner: Not on file    Emotionally Abused: Not on file    Physically Abused: Not on file    Sexually Abused: Not on file   Housing Stability:     Unable to Pay for Housing in the Last Year: Not on file    Number of [place] in the Last Year: Not on file    Unstable Housing in the Last Year: Not on file       Allergies:   No Known Allergies    Medications:    No current outpatient medications on file. No current facility-administered medications for this visit. Review of Systems:  Review of Systems   Constitutional: Positive for fatigue. Negative for chills and fever. HENT: Negative for sinus pain, sneezing and voice change. Eyes: Negative for photophobia, redness and visual disturbance. Respiratory: Negative for apnea and wheezing. Cardiovascular: Negative for chest pain, palpitations and leg swelling. Gastrointestinal: Negative for constipation, diarrhea, nausea and vomiting. Endocrine: Negative for polyphagia and polyuria. Genitourinary: Negative for dysuria, vaginal discharge and vaginal pain. Musculoskeletal: Negative for arthralgias, back pain, gait problem and myalgias. Skin: Negative for pallor, rash and wound. Neurological: Negative for tremors, seizures, syncope and weakness. Psychiatric/Behavioral: Negative for self-injury, sleep disturbance and suicidal ideas. The patient is nervous/anxious. The patient is not hyperactive.      ______________________________________________________________________    Physical Exam:    Vitals: BP (!) 108/54 (Site: Right Upper Arm, Position: Sitting, Cuff Size: Medium Adult)   Pulse 74   Temp 98.1 °F (36.7 °C) (Temporal)   Resp 14   Ht 5' 4\" (1.626 m)   Wt 129 lb (58.5 kg)   LMP 03/04/2022   SpO2 99%   BMI 22.14 kg/m²   Physical Exam  Constitutional:       General: She is not in acute distress. Appearance: She is normal weight. She is not ill-appearing or toxic-appearing. HENT:      Head: Normocephalic. Eyes:      Extraocular Movements: Extraocular movements intact. Conjunctiva/sclera: Conjunctivae normal.      Pupils: Pupils are equal, round, and reactive to light. Cardiovascular:      Rate and Rhythm: Normal rate and regular rhythm. Pulses: Normal pulses. Heart sounds: No murmur heard. No gallop. Pulmonary:      Effort: Pulmonary effort is normal. No respiratory distress. Breath sounds: No stridor. No wheezing or rhonchi. Abdominal:      General: Abdomen is flat. Bowel sounds are normal. There is no distension. Palpations: Abdomen is soft.    Musculoskeletal: General: No swelling. Normal range of motion. Cervical back: Normal range of motion. Right lower leg: No edema. Left lower leg: No edema. Skin:     General: Skin is warm. Capillary Refill: Capillary refill takes less than 2 seconds. Findings: No bruising, lesion or rash. Neurological:      Mental Status: She is alert and oriented to person, place, and time. Mental status is at baseline. Motor: No weakness. Gait: Gait normal.   Psychiatric:         Mood and Affect: Mood normal.         Behavior: Behavior normal.         Thought Content: Thought content normal.      Comments: Not anxious or nervous appearing this visit.       ______________________________________________________________________    Assessment & Plan:  1. Healthcare maintenance    Will come back for pap smear in 2 weeks. Chlamydia screen to be done then. HPV vaccine to be discussed then. - HIV Screen; Future  - Hepatitis C Antibody; Future    2. Anxiety  Will see psychology on March 24. Anxiety has not worsened since previous visit and states her therapy cat has provided some relief of symptoms. At this point, she does not want to start any medications but is agreeable to start medicine in the future if needed. Will coordinate with Parker Enamorado regarding her mental health care and evaluate if she would benefit from SSRIs in the future or if Cognitive behavioral therapy as monotherapy would be more ideal for her.   - TSH with Reflex to FT4; Future        Return to Office: Return in about 2 weeks (around 3/31/2022) for Pap smear.     Karina Hernandez MD   This case was discussed with Dr. Jacques Clemens

## 2022-03-18 LAB
HEPATITIS C ANTIBODY INTERPRETATION: NORMAL
HIV-1 AND HIV-2 ANTIBODIES: NORMAL
T4 FREE: 1.49 NG/DL (ref 0.93–1.7)
TSH SERPL DL<=0.05 MIU/L-ACNC: 0.56 UIU/ML (ref 0.27–4.2)

## 2022-03-18 ASSESSMENT — ENCOUNTER SYMPTOMS
SINUS PAIN: 0
BACK PAIN: 0
DIARRHEA: 0
EYE REDNESS: 0
NAUSEA: 0
WHEEZING: 0
APNEA: 0
VOICE CHANGE: 0
CONSTIPATION: 0
VOMITING: 0
PHOTOPHOBIA: 0

## 2022-04-06 ENCOUNTER — OFFICE VISIT (OUTPATIENT)
Dept: FAMILY MEDICINE CLINIC | Age: 22
End: 2022-04-06
Payer: MEDICAID

## 2022-04-06 VITALS
DIASTOLIC BLOOD PRESSURE: 72 MMHG | HEIGHT: 63 IN | SYSTOLIC BLOOD PRESSURE: 104 MMHG | WEIGHT: 127.2 LBS | RESPIRATION RATE: 18 BRPM | BODY MASS INDEX: 22.54 KG/M2 | OXYGEN SATURATION: 100 % | TEMPERATURE: 97.2 F | HEART RATE: 97 BPM

## 2022-04-06 DIAGNOSIS — Z12.4 PAP SMEAR FOR CERVICAL CANCER SCREENING: ICD-10-CM

## 2022-04-06 DIAGNOSIS — Z01.419 WELL WOMAN EXAM: Primary | ICD-10-CM

## 2022-04-06 PROCEDURE — 99212 OFFICE O/P EST SF 10 MIN: CPT

## 2022-04-06 PROCEDURE — 99395 PREV VISIT EST AGE 18-39: CPT

## 2022-04-06 NOTE — PROGRESS NOTES
S: 24 y.o. female here for her first well woman examination. Patient is sexually active, has regular periods, is not taking any oral contraception, denies any vaginal discharge or breast concerns. Her periods last approximately 4 days. They are not excessively heavy. O: VS: /72 (Site: Left Upper Arm, Position: Sitting, Cuff Size: Medium Adult)   Pulse 97   Temp 97.2 °F (36.2 °C) (Temporal)   Resp 18   Ht 5' 3\" (1.6 m)   Wt 127 lb 3.2 oz (57.7 kg)   LMP 03/26/2022 (Approximate)   SpO2 100%   BMI 22.53 kg/m²    General: NAD, appropriate affect and grooming   Breast exam:  Breasts are symmetric bilaterally; there are no masses, pain, lumps, skin changes or nipple discharge    examination:  Normal external genitalia; vaginal opening with mild amount of thin white discharge; vaginal walls were pink and without lesion; the cervix was erythematous but without lesions; Os was viewed and brush was utilized for the Thin Prep pap testing; normal bimanual examination   Ext:  No edema  Impression/Plan:  1. Well woman exam  -thin prep pap  -GC/Chla testing  -will call patient with results. Attending Physician Statement  I have discussed the case, including pertinent history and exam findings with the resident. I also have seen the patient and performed key portions of the examination. I agree with the documented assessment and plan.

## 2022-04-06 NOTE — PROGRESS NOTES
SUBJECTIVE:   24 y.o. female for annual routine Pap and checkup. No current outpatient medications on file. No current facility-administered medications for this visit. Allergies: Patient has no known allergies. Patient's last menstrual period was 03/26/2022 (approximate). 24year old here for well women examination. Menses last regularly 4 days every 30 days with normal flow. Sexually active . Not on any contraceptives at the moment  No family history of Breast,Cervical cancer      ROS:  Feeling well. No dyspnea or chest pain on exertion. No abdominal pain, change in bowel habits, black or bloody stools. No urinary tract symptoms. GYN ROS: normal menses, no abnormal bleeding, pelvic pain or discharge, no breast pain or new or enlarging lumps on self exam. No neurological complaints. OBJECTIVE:   The patient appears well, alert, oriented x 3, in no distress. /72 (Site: Left Upper Arm, Position: Sitting, Cuff Size: Medium Adult)   Pulse 97   Temp 97.2 °F (36.2 °C) (Temporal)   Resp 18   Ht 5' 3\" (1.6 m)   Wt 127 lb 3.2 oz (57.7 kg)   LMP 03/26/2022 (Approximate)   SpO2 100%   BMI 22.53 kg/m²   ENT normal.  Neck supple. No adenopathy or thyromegaly. NORM. Lungs are clear, good air entry, no wheezes, rhonchi or rales. S1 and S2 normal, no murmurs, regular rate and rhythm. Abdomen soft without tenderness, guarding, mass or organomegaly. Extremities show no edema, normal peripheral pulses. Neurological is normal, no focal findings. BREAST EXAM: breasts appear normal, no suspicious masses, no skin or nipple changes or axillary nodes    PELVIC EXAM: normal external genitalia, vulva, vagina, cervix, uterus and adnexa, VAGINA: scant vaginal discharge - white and creamy, CERVIX: mildly erythematous but no visible lesions.     ASSESSMENT:   well woman    PLAN:   pap smear  additional lab tests per orders  return annually or prn    This note is electronically signed by Tena Tubbs Ruy River MD on 4/6/2022 at 6:54 PM  Reviewed by attending physician : Betty Daley

## 2022-04-11 ENCOUNTER — TELEPHONE (OUTPATIENT)
Dept: FAMILY MEDICINE CLINIC | Age: 22
End: 2022-04-11

## 2022-04-11 LAB
CHLAMYDIA BY THIN PREP: NEGATIVE
N. GONORRHOEAE DNA, THIN PREP: NEGATIVE
SOURCE: NORMAL

## 2022-04-11 NOTE — TELEPHONE ENCOUNTER
Attempted to call  patient x 3 to inform her about Pap smear results. Unable to reach her at current number.       This note is electronically signed by Lee Saravia MD on 4/11/2022 at 3:22 PM

## 2022-07-27 ENCOUNTER — TELEPHONE (OUTPATIENT)
Dept: FAMILY MEDICINE CLINIC | Age: 22
End: 2022-07-27

## 2022-07-27 NOTE — TELEPHONE ENCOUNTER
----- Message from Jessica Lopez sent at 7/26/2022  4:24 PM EDT -----  Subject: Message to Provider    QUESTIONS  Information for Provider? Pt says she did a pap smear in the office with   Huong Oakes MD and have not received any results back from it,   ---------------------------------------------------------------------------  --------------  [id number] [facility]  [phone]; OK to leave message on voicemail  ---------------------------------------------------------------------------  --------------  SCRIPT ANSWERS  Relationship to Patient?  Self

## 2022-07-29 NOTE — PROGRESS NOTES
Behavioral Health Assessment   [facility]    Time Start: 1:00 p.m. Time End:  2:00 p.m. Date of Service:  8/1/2022    Referral Information:  Referred by: Nunu Hwang MD    Reason for referral:  Anxiety    Basis of Evaluation:    [x]  Clinical Interview  [x]  Review of Medical Record [x] Patient Health Questionnaire [facility])  []  Interview family member    Presenting Concerns and History of present condition:  Severiano Laughter reported a several year history of anxiety, beginning when she was in middle or high school. She is a full-time college student at [employer]. Stated it worsened in the Spring of 2022, GPA dropped to 2.7 from previously being 3.2. Journalism major. Felt overwhelmed with academic work, hard to catch up after getting behind. Stopped going to some classes and failed classes. Wants to apply for Retroactive Withdrawal.  Her financial aid has been cut and she will owe $4000 for the Fall 2022 semester, which she cannot afford. States financial aid can be restored if she gets the Retroactive Withdrawal.  Panic attacks 1x/ month, triggered by school or work. Worsening anxiety with the start of the Fall 2022 semester approaching on 8/29/22. Denies irritability. Intermittent depressed mood, no clear triggers. Anhedonia. Mild low SE. She stated avoidance of stressful situations has been a problem for her. Severiano Laughter reported the following symptoms of depression: depressed mood, anhedonia, fatigue, feelings of worthlessness/guilt, and difficulty concentrating  Severiano Laughter reported symptoms of anxiety: feeling nervous/anxious/on edge, difficulty controlling worry, difficulty relaxing, and restlessness.     Objective Testing:  PHQ-9 =  12  Moderate depression;  ERICK-7=   12 Moderate anxiety      Mental Status:    Appearance: Appears stated age and Well-kept   Affect:  restricted   Mood:   Dysphoric, anxious   Thought Process:  goal directed   Thought Content: no evidence of psychosis   Behavior:   Mildly guarded   Psychomotor: Tense   Speech: Within normal limits   Eye Contact: good   Orientation:  oriented to person, place, time, and general circumstances   Judgment & Insight:  fair       Risk Assessment:    Current Suicide Risk:  denies current suicidal ideation, plan and intent  Current Homicide Risk: denies current homicidal ideation, plan and intent   Protective Factors: denial of impulses, hopeful for improved circumstances, and future goals      Social History/Functioning:      Living arrangements: On campus apartment. 3 roommates with whom she is friends. Marital & Family History:  Boyfriend Rubina Gramajo of 3 years. Also college student at [employer]. Father is in [employer]. Limited contact with mother, who lives in [place]. Parents are no longer together. 1 older sister Mau Broderick 22 - lives in [place]) and 2 younger sisters Maria C Lisa 25 - lives with father, Mateus Floyd 21- lives in [address] [facility]). Close with father and sisters. Childhood History: Born in [place]. Attended [employer] [address] in [place] for one year, where her mother's family lives. Then [place] [address]  [facility] she [facility] [employer]. Family Mental Health and Substance Abuse History: Mother depression, anxiety, possibly Bipolar disorder  Father depression. Parents drug issues in the past.     Cultural/Ethnic Information:     Muslim/Spirituality Information:  Does not attend Muslim, but somewhat Buddhist. Raised Catholic    Education History: Attending college, [employer]    Employment History: [employer] 30 hrs/week. 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 Financial Status:  Has to pay $4000 for Fall semester due to losing part of financial aid because of her drop in grades. No financial help from family.     Community Resources: Medicaid     History: None    Legal History:  none    Activities/Interests: Graphic design and painting      MENTAL HEALTH & TREATMENT HISTORY    Past diagnosis(es): N/A    Psychotherapy: None     Psychotropic medication management: None    Crisis contact/Psychiatric Hospitalizations: None    Eating Disorder: None    Trauma/Abuse History: Declined to answer    Severiano Laughter reported some suicidal thoughts in the past, but denied any attempts. SUBSTANCE USE HISTORY    Alcohol:  Socially 1-2x every 2 weeks. Wine mostly, 2-3 drinks per occasion. Tobacco:  Vapes daily    Illicit substances:  None    Substance use treatment history: None      MEDICAL HEALTH HISTORY:    Current medical issues:     No current outpatient medications on file. No current facility-administered medications for this visit. Past Medical History:   Diagnosis Date    Anxiety         Pain Assessment:  none    Difficulty with Activities of Daily Living:   None identified    Changes in appetite or food intake?:  low    Weight loss/gain of more than 10 lbs in the past 3 months? 30 lbs over the past 4 years    Food allergies:  None    Dental issues/problems:  None        Diagnosis(es):    ICD-10-CM    1. Major depressive disorder, recurrent episode, moderate with anxious distress (HCC)  F33.1             Assessment and Conceptualization:  Severiano Laughter is a 25year old woman with a several year history of anxiety. These symptoms reportedly impacted her academic performance in college. She is requesting a letter documenting treatment plan for her Retroactive Withdrawal appeal.      Recommendations: Individual behavioral health therapy is recommended. Initial intervention(s): Recommended that she limit her courseload to 12 hours in Fall 2022, she had considered taking 15 hours. Provided letter documenting that she attended today's visit and the preliminary treatment plan to [employer] (see media attachment).     Discussed the following:  - Initial diagnosis  - Risks and benefits of psychotherapy  - Options/alternatives to treatment  - Limits of confidentiality      Initial Treatment Plan: (full treatment plan will be created at next session)    Goal(s): To reduce symptoms of anxiety and depression  Care coordination: Treatment will be coordinated with PCP. Frequency of Service: 1-2 sessions every 2 weeks. Projected Discharge Date:   6 months      Consent:  Agnes Goldstein verbalized understanding of informed consent and treatment plan. Patient is motivated and capable of participating in treatment. Scheduled follow-up: 1 week(s), 8/10/22.       Electronically signed by Marcella Garcia, PhD

## 2022-08-01 ENCOUNTER — OFFICE VISIT (OUTPATIENT)
Dept: PSYCHOLOGY | Age: 22
End: 2022-08-01
Payer: MEDICAID

## 2022-08-01 DIAGNOSIS — F33.1 MAJOR DEPRESSIVE DISORDER, RECURRENT EPISODE, MODERATE WITH ANXIOUS DISTRESS (HCC): Primary | ICD-10-CM

## 2022-08-01 PROCEDURE — 1036F TOBACCO NON-USER: CPT | Performed by: PSYCHOLOGIST

## 2022-08-01 PROCEDURE — 90791 PSYCH DIAGNOSTIC EVALUATION: CPT | Performed by: PSYCHOLOGIST

## 2022-08-01 ASSESSMENT — PATIENT HEALTH QUESTIONNAIRE - PHQ9
SUM OF ALL RESPONSES TO PHQ QUESTIONS 1-9: 12
SUM OF ALL RESPONSES TO PHQ9 QUESTIONS 1 & 2: 3
10. IF YOU CHECKED OFF ANY PROBLEMS, HOW DIFFICULT HAVE THESE PROBLEMS MADE IT FOR YOU TO DO YOUR WORK, TAKE CARE OF THINGS AT HOME, OR GET ALONG WITH OTHER PEOPLE: 2
3. TROUBLE FALLING OR STAYING ASLEEP: 1
7. TROUBLE CONCENTRATING ON THINGS, SUCH AS READING THE NEWSPAPER OR WATCHING TELEVISION: 1
SUM OF ALL RESPONSES TO PHQ QUESTIONS 1-9: 12
SUM OF ALL RESPONSES TO PHQ QUESTIONS 1-9: 12
8. MOVING OR SPEAKING SO SLOWLY THAT OTHER PEOPLE COULD HAVE NOTICED. OR THE OPPOSITE, BEING SO FIGETY OR RESTLESS THAT YOU HAVE BEEN MOVING AROUND A LOT MORE THAN USUAL: 1
6. FEELING BAD ABOUT YOURSELF - OR THAT YOU ARE A FAILURE OR HAVE LET YOURSELF OR YOUR FAMILY DOWN: 2
9. THOUGHTS THAT YOU WOULD BE BETTER OFF DEAD, OR OF HURTING YOURSELF: 0
2. FEELING DOWN, DEPRESSED OR HOPELESS: 1
SUM OF ALL RESPONSES TO PHQ QUESTIONS 1-9: 12
1. LITTLE INTEREST OR PLEASURE IN DOING THINGS: 2
4. FEELING TIRED OR HAVING LITTLE ENERGY: 2
5. POOR APPETITE OR OVEREATING: 2

## 2022-08-01 ASSESSMENT — ANXIETY QUESTIONNAIRES
IF YOU CHECKED OFF ANY PROBLEMS ON THIS QUESTIONNAIRE, HOW DIFFICULT HAVE THESE PROBLEMS MADE IT FOR YOU TO DO YOUR WORK, TAKE CARE OF THINGS AT HOME, OR GET ALONG WITH OTHER PEOPLE: EXTREMELY DIFFICULT
7. FEELING AFRAID AS IF SOMETHING AWFUL MIGHT HAPPEN: 2
1. FEELING NERVOUS, ANXIOUS, OR ON EDGE: 3
4. TROUBLE RELAXING: 2
5. BEING SO RESTLESS THAT IT IS HARD TO SIT STILL: 1
GAD7 TOTAL SCORE: 12
3. WORRYING TOO MUCH ABOUT DIFFERENT THINGS: 2
6. BECOMING EASILY ANNOYED OR IRRITABLE: 0
2. NOT BEING ABLE TO STOP OR CONTROL WORRYING: 2

## 2022-08-10 ENCOUNTER — OFFICE VISIT (OUTPATIENT)
Dept: FAMILY MEDICINE CLINIC | Age: 22
End: 2022-08-10
Payer: MEDICAID

## 2022-08-10 VITALS
DIASTOLIC BLOOD PRESSURE: 69 MMHG | OXYGEN SATURATION: 100 % | RESPIRATION RATE: 16 BRPM | HEART RATE: 78 BPM | SYSTOLIC BLOOD PRESSURE: 103 MMHG | TEMPERATURE: 97.6 F | BODY MASS INDEX: 24.1 KG/M2 | WEIGHT: 136 LBS | HEIGHT: 63 IN

## 2022-08-10 DIAGNOSIS — M54.6 THORACIC BACK PAIN, UNSPECIFIED BACK PAIN LATERALITY, UNSPECIFIED CHRONICITY: Primary | ICD-10-CM

## 2022-08-10 PROCEDURE — 99213 OFFICE O/P EST LOW 20 MIN: CPT

## 2022-08-10 PROCEDURE — G8420 CALC BMI NORM PARAMETERS: HCPCS

## 2022-08-10 PROCEDURE — 1036F TOBACCO NON-USER: CPT

## 2022-08-10 PROCEDURE — G8427 DOCREV CUR MEDS BY ELIG CLIN: HCPCS

## 2022-08-10 PROCEDURE — 99212 OFFICE O/P EST SF 10 MIN: CPT

## 2022-08-10 SDOH — ECONOMIC STABILITY: FOOD INSECURITY: WITHIN THE PAST 12 MONTHS, YOU WORRIED THAT YOUR FOOD WOULD RUN OUT BEFORE YOU GOT MONEY TO BUY MORE.: NEVER TRUE

## 2022-08-10 SDOH — ECONOMIC STABILITY: TRANSPORTATION INSECURITY
IN THE PAST 12 MONTHS, HAS THE LACK OF TRANSPORTATION KEPT YOU FROM MEDICAL APPOINTMENTS OR FROM GETTING MEDICATIONS?: NO

## 2022-08-10 SDOH — ECONOMIC STABILITY: TRANSPORTATION INSECURITY
IN THE PAST 12 MONTHS, HAS LACK OF TRANSPORTATION KEPT YOU FROM MEETINGS, WORK, OR FROM GETTING THINGS NEEDED FOR DAILY LIVING?: NO

## 2022-08-10 SDOH — ECONOMIC STABILITY: FOOD INSECURITY: WITHIN THE PAST 12 MONTHS, THE FOOD YOU BOUGHT JUST DIDN'T LAST AND YOU DIDN'T HAVE MONEY TO GET MORE.: NEVER TRUE

## 2022-08-10 ASSESSMENT — SOCIAL DETERMINANTS OF HEALTH (SDOH): HOW HARD IS IT FOR YOU TO PAY FOR THE VERY BASICS LIKE FOOD, HOUSING, MEDICAL CARE, AND HEATING?: NOT HARD AT ALL

## 2022-08-10 ASSESSMENT — LIFESTYLE VARIABLES: HOW OFTEN DO YOU HAVE A DRINK CONTAINING ALCOHOL: NEVER

## 2022-08-10 NOTE — PROGRESS NOTES
Middle back pain. Middle back pain can have causes that aren't due to underlying disease. Examples include overuse (lifting, new exercise, etc),    No red flags  Refer to PT  OK to use Ibuprofen 1-2x a week  OK to use flexeril if sx flare. F/u if not improving as expected.

## 2022-08-10 NOTE — PROGRESS NOTES
[facility] RESIDENCY PROGRAM  DATE OF VISIT : 2022    Patient : Holli Madrid   Age : 25 y.o.  : 2000   MRN : [id number]   ________________________________________________________________    Chief Complaint:   Chief Complaint   Patient presents with    Check-Up     Back pain       HPI:   63-year-old female here for thoracic back pain. States that her back pain started 2 years ago while she was doing yoga class. Has not taken any medicines for it. Has not had this worked up in the past nor has she seek medical treatment for this before. Reports no worsening of her current back pain. States that her back pain gets worse with bending and flexing standing or any sort of physical activity. Denies having bowel bladder incontinence, weakness on lower extremities or upper extremities, tingling or any other sort of radicular symptoms. No fevers chills chest pain    ROS:  Pertinent positives and negatives are stated within HPI    Physical Exam:    Vitals: /69 (Site: Left Upper Arm, Position: Sitting, Cuff Size: Medium Adult)   Pulse 78   Temp 97.6 °F (36.4 °C) (Temporal)   Resp 16   Ht 5' 3\" (1.6 m)   Wt 136 lb (61.7 kg)   LMP 2022   SpO2 100%   BMI 24.09 kg/m²   Physical Exam  Constitutional:       General: She is not in acute distress. Appearance: She is normal weight. She is not ill-appearing. Comments: visibly hunching    HENT:      Head: Normocephalic and atraumatic. Eyes:      Conjunctiva/sclera: Conjunctivae normal.      Pupils: Pupils are equal, round, and reactive to light. Cardiovascular:      Rate and Rhythm: Normal rate and regular rhythm. Heart sounds: Normal heart sounds. No murmur heard. No friction rub. No gallop. Pulmonary:      Effort: Pulmonary effort is normal.      Breath sounds: Normal breath sounds. No wheezing, rhonchi or rales.    Abdominal:      General: Bowel sounds are normal.      Palpations: Abdomen is recommended health maintenance and to schedule as soon as possible if overdue, as this is important in assessing for undiagnosed pathology, especially cancer, as well as protecting against potentially harmful/life threatening disease. Patient and/or guardian verbalizes understanding and agrees with above counseling, assessment and plan. All questions answered. Return to Office: Return in about 1 month (around 9/10/2022) for back pain .     Jimmy Cosme MD   This case was discussed with Dr. Nav Patel

## 2022-08-10 NOTE — PROGRESS NOTES
S: 25 y.o. female presents today for:     Thoracic back pain- started 2 years ago during yoga, worse with bending, flexing or any physical activity. Denies radicular symptoms. No OTC meds. Pain is not increasing in intensity, not expanding to adjacent areas. O: VS: /69 (Site: Left Upper Arm, Position: Sitting, Cuff Size: Medium Adult)   Pulse 78   Temp 97.6 °F (36.4 °C) (Temporal)   Resp 16   Ht 5' 3\" (1.6 m)   Wt 136 lb (61.7 kg)   LMP 07/18/2022   SpO2 100%   BMI 24.09 kg/m²   AAO/NAD, appropriate affect for mood  CV:  RRR, no murmur  Resp: CTAB  Musculoskeletal: MS 5/5, DTR 2/4 x4 extremities, FROM F/E spine, no tenderness, obvious masses or deformities. Gait normal. Poor posture, symmetric shoulders, no scoliosis  CN II-XII grossly intact, no focal deficits noted. Impression/Plan:   1) thoracic/mid-back pain- xrays, ibuprofen prn, stretching, PT      Attending Physician Statement  I have discussed the case, including pertinent history and exam findings with the resident. I agree with the documented assessment and plan.       Melab Ruano,

## 2022-08-19 NOTE — PROGRESS NOTES
Behavioral Health Follow-Up    [facility]    Time Start: 10:40 a.m. Time End:  11:10 a.m. Date of Service:  8/22/2022  Session #: 2    Subjective:  States she will begin college classes Aug 29. Worried about performance and how she will balance school and work demands. Objective:  Mental status:    Appearance: Appears stated age   Affect:  restricted   Mood:   Dysphoric   Thought Process:  Linear and goal directed   Thought Content: no evidence of psychosis   Behavior:   Anxious   Psychomotor: Within normal limits   Speech:   Soft   Eye Contact: good   Orientation:  oriented to person, place, time, and general circumstances   Judgment & Insight:  normal insight and judgment     Assessment:   Progress/response since intake/last session: none expected, treatment recently initiated    Risk: denies current suicidal ideation, plan and intent; denies current homicidal ideation, plan and intent. Protective Factors: denial of impulses, hopeful for improved circumstances, and future goals      ICD-10-CM    1. Major depressive disorder, recurrent episode, moderate with anxious distress ([facility] [address]  F33.1           Psychotherapy Intervention:  Modalities: Treatment planning; Problem-solving    Created treatment plan below. Discussed treatment modalities. She wants to focus on time management for better academic improvements and improved socialization. Set goal to start better time management by getting a planner. She is taking 15 credit hours and working 25-30 hrs/week. Set goal to schedule time each week for studying/academic work around her school and work schedules. States she has not been socializing much with friends, although they still invite her to join them. Set socialization goal to get to better know her new roommate Abimael Gracia.     Treatment goal(s) addressed: #1-2 below    Plan:  Homework/recommendations: Purchase planner, fill in school/work schedule as well as establish time for studying (at [employer])    Follow up:  2 weeks, scheduled for 9/7/22. Electronically signed by Adeola Auguste, PhD    _____________________________________________________________________________________    TREATMENT PLAN:    Date created: 8/22/22      Goal #1: Problem:  Anxiety  Description: GAD7 (intake) = 12 Moderate Anxiety. Symptoms: Panic attacks 1x/ month, triggered by school or work. Worsening anxiety with the start of the Fall 2022 semester approaching on 8/29/22. She stated avoidance of stressful situations has been a problem for her. Feeling nervous/anxious/on edge, difficulty controlling worry, difficulty relaxing, and restlessness. Goal: To decrease the frequency and severity of anxiety symptoms. Progress indicator(s): Decreased GAD7 score. Goal #2: Problem:  Depression  Description: PHQ-9 (intake) = 12 moderate depression. Symptoms: depressed mood, anhedonia, fatigue, feelings of worthlessness/guilt, and difficulty concentrating. Goal:  To decrease the frequency and severity of depression symptoms. Progress indicators: Decreased PHQ9 score. Timeline:     Mild Progress Moderate Progress Goal Met     Description: Time management regarding work and school. Resolve procrastination. Socialize with friends 1x/week    Get to know roommate Sherley    25% reduction on PHQ9 and GAD7 Pass all 15 credit hours of college. 50% reduction on PHQ9 and GAD7     75% reduction on PHQ9 and GAD7   Target time frame: 2 months or ~ 5 visits   4 months or ~ 10 visits 6 months or ~ 15 visits   Notes on progress:        Visits will occur every 2 weeks. Length of treatment plan: 6 months    Types of treatment to be used: Cognitive Behavioral    Other professional, community, and/or natural supports involved in treatment: PCP, Dr. Tesha Olmos is in agreement with this treatment plan and was provided a copy.

## 2022-08-22 ENCOUNTER — OFFICE VISIT (OUTPATIENT)
Dept: PSYCHOLOGY | Age: 22
End: 2022-08-22
Payer: MEDICAID

## 2022-08-22 DIAGNOSIS — F33.1 MAJOR DEPRESSIVE DISORDER, RECURRENT EPISODE, MODERATE WITH ANXIOUS DISTRESS (HCC): Primary | ICD-10-CM

## 2022-08-22 PROCEDURE — 1036F TOBACCO NON-USER: CPT | Performed by: PSYCHOLOGIST

## 2022-08-22 PROCEDURE — 90832 PSYTX W PT 30 MINUTES: CPT | Performed by: PSYCHOLOGIST

## (undated) DEVICE — NEEDLE HYPO 25GA L1.5IN BLU POLYPR HUB S STL REG BVL STR

## (undated) DEVICE — SEALER TISS L35CM ADV BPLR STR TIP LAP APPRCH ENSEAL G2

## (undated) DEVICE — KIT,ANTI FOG,W/SPONGE & FLUID,SOFT PACK: Brand: MEDLINE

## (undated) DEVICE — SURGICAL PROCEDURE PACK BASIC

## (undated) DEVICE — RELOAD STPL H1-2.5X45MM VASC THN TISS WHT 6 ROW B FRM SGL

## (undated) DEVICE — NEEDLE CLOSURE OMNICLOSE

## (undated) DEVICE — APPLICATOR MEDICATED 26 CC SOLUTION HI LT ORNG CHLORAPREP

## (undated) DEVICE — SYRINGE MED 10ML LUERLOCK TIP W/O SFTY DISP

## (undated) DEVICE — SET ENDO INSTR LAPAROSCOPIC STGENLAP

## (undated) DEVICE — TOWEL,OR,DSP,ST,BLUE,STD,6/PK,12PK/CS: Brand: MEDLINE

## (undated) DEVICE — INTENDED FOR TISSUE SEPARATION, AND OTHER PROCEDURES THAT REQUIRE A SHARP SURGICAL BLADE TO PUNCTURE OR CUT.: Brand: BARD-PARKER ® STAINLESS STEEL BLADES

## (undated) DEVICE — GLOVE ORANGE PI 7   MSG9070

## (undated) DEVICE — CUTTER ENDOSCP L340MM LIN ARTC SGL STROKE FIRING ENDOPATH

## (undated) DEVICE — Z DUP USE 2257490 ADHESIVE SKIN CLSRE 036ML TPCL 2CTL CNCRLTE HIGH VSCSTY DRMB

## (undated) DEVICE — PATIENT RETURN ELECTRODE, SINGLE-USE, CONTACT QUALITY MONITORING, ADULT, WITH 9FT CORD, FOR PATIENTS WEIGING OVER 33LBS. (15KG): Brand: MEGADYNE

## (undated) DEVICE — SET INSTRUMENT LAP I

## (undated) DEVICE — TROCAR: Brand: KII® SLEEVE

## (undated) DEVICE — Z DISCONTINUED NO SUB IDED BAG SPEC RETRV M C240ML MOUTH 7.3MM L17CM SHFT 10MM NYL EZEE

## (undated) DEVICE — SYRINGE 20ML LL S/C 50

## (undated) DEVICE — GLOVE SURG SZ 65 THK91MIL LTX FREE SYN POLYISOPRENE

## (undated) DEVICE — TROCAR: Brand: KII SHIELDED BLADED ACCESS SYSTEM

## (undated) DEVICE — TIBURON GENERAL ENDOSCOPY DRAPE: Brand: CONVERTORS

## (undated) DEVICE — TOTAL TRAY, DB, 100% SILI FOLEY, 16FR 10: Brand: MEDLINE

## (undated) DEVICE — RELOAD STPL SZ 0 L45MM DIA3.5MM 0DEG STD REG TISS BLU TI

## (undated) DEVICE — WARMER SCP LAP

## (undated) DEVICE — INSUFFLATION TUBING SET WITH FILTER, FUNNEL CONNECTOR AND LUER LOCK: Brand: JOSNOE MEDICAL INC

## (undated) DEVICE — INSUFFLATION NEEDLE TO ESTABLISH PNEUMOPERITONEUM.: Brand: INSUFFLATION NEEDLE

## (undated) DEVICE — TROCAR: Brand: KII FIOS FIRST ENTRY

## (undated) DEVICE — CAMERA STRYKER 1488 HD GEN